# Patient Record
Sex: FEMALE | Race: WHITE | NOT HISPANIC OR LATINO | Employment: FULL TIME | URBAN - NONMETROPOLITAN AREA
[De-identification: names, ages, dates, MRNs, and addresses within clinical notes are randomized per-mention and may not be internally consistent; named-entity substitution may affect disease eponyms.]

---

## 2017-12-13 ENCOUNTER — INITIAL PRENATAL (OUTPATIENT)
Dept: OBSTETRICS AND GYNECOLOGY | Facility: CLINIC | Age: 32
End: 2017-12-13

## 2017-12-13 VITALS
SYSTOLIC BLOOD PRESSURE: 128 MMHG | WEIGHT: 148 LBS | HEIGHT: 62 IN | BODY MASS INDEX: 27.23 KG/M2 | DIASTOLIC BLOOD PRESSURE: 66 MMHG

## 2017-12-13 DIAGNOSIS — Z34.92 NORMAL PREGNANCY, SECOND TRIMESTER: ICD-10-CM

## 2017-12-13 DIAGNOSIS — Z34.92 PREGNANT AND NOT YET DELIVERED IN SECOND TRIMESTER: Primary | ICD-10-CM

## 2017-12-13 PROCEDURE — 99204 OFFICE O/P NEW MOD 45 MIN: CPT | Performed by: NURSE PRACTITIONER

## 2017-12-13 RX ORDER — PRENATAL VIT NO.126/IRON/FOLIC 28MG-0.8MG
TABLET ORAL DAILY
COMMUNITY
End: 2018-07-31

## 2017-12-13 RX ORDER — AMOXICILLIN 875 MG/1
875 TABLET, COATED ORAL 2 TIMES DAILY
COMMUNITY
End: 2018-01-03

## 2017-12-13 NOTE — PROGRESS NOTES
Chief Complaint   Patient presents with   • Initial Prenatal Visit     LMP 17, last pap with [facility], c/o abnormal discharge and has had one episode of bloody discharge, seen at hospital in [place]         HPI  , 12w0d presents to our office today for confirmation of pregnancy.  She reports she is doing well with x mild 1st trimester discomforts of pregnancy, n/v, fatigue, vicente/irritability.  Also c/o abnormal discharge - no itching or burning   States not certain of LMP   Working at Nursing Home   Taking PNV and tolerating   Smoking 5 cigs / day   Prev Preg's: 2   - last pregnancy C/S for breech       Past Medical History:   Diagnosis Date   • Asthma    • History of blood transfusion         Current Outpatient Prescriptions:   •  amoxicillin (AMOXIL) 875 MG tablet, Take 875 mg by mouth 2 (Two) Times a Day., Disp: , Rfl:   •  Prenatal Vit-Fe Fumarate-FA (PRENATAL, CLASSIC, VITAMIN) 28-0.8 MG tablet tablet, Take  by mouth Daily., Disp: , Rfl:    Allergies   Allergen Reactions   • Ceclor [Cefaclor]    • Sulfa Antibiotics       Past Surgical History:   Procedure Laterality Date   •  SECTION         Social History     Social History   • Marital status: Single     Spouse name: N/A   • Number of children: N/A   • Years of education: N/A     Occupational History   • Not on file.     Social History Main Topics   • Smoking status: Current Every Day Smoker     Packs/day: 0.25   • Smokeless tobacco: Never Used   • Alcohol use No   • Drug use: No   • Sexual activity: Yes     Partners: Male     Birth control/ protection: None     Other Topics Concern   • Not on file     Social History Narrative   • No narrative on file      History reviewed. No pertinent family history.    The following portions of the patient's history were reviewed and updated as appropriate:problem list, current medications, allergies, past family history, past medical history, past social history and past surgical  "history.    ROS    Pertinent items are noted in HPI, all other systems reviewed and negative    Physical Exam  /66  Ht 157.5 cm (62\")  Wt 67.1 kg (148 lb)  LMP 09/20/2017 (Approximate)  BMI 27.07 kg/m2     Psych: Altert and oriented to time, place and person  Mood and affect appropriate   General: well developed; well nourished  no acute distress  Neck: The neck is supple and the trachea is midline  Musculoskeletal: Normal gait  Full range of motion  Lungs:  breathing is unlabored  Back: Negative CVAT  Abdomen: Soft, non-tender, no organomegaly + FHT's  Lower Extremities: LE: Neg edema  Genitourinary: External Genitalia without erythema, lesions, or masses, Perineum is without inflammation or lesions      MDM  Impression:  Problems/Risks: Pregnancy with Active Problems(s) &/or Complication(s)  Previous C/S   Discomforts of pregnancy  Tobacco use in pregnancy   Tests done today: NuSwab culture  Rout NOB labs with HIV, CC urine culture & option CF screening   Topics discussed: Rout NOB education including nutrition, exercise, OTCmeds   screening options - CF today    adeq rest and fluids   flu vac  Tobacco use in pregnancy  discussed timing of u/s's  encouraged questions - call prn    Tests next visit: none     "

## 2017-12-13 NOTE — PATIENT INSTRUCTIONS
Second Trimester of Pregnancy  The second trimester is from week 13 through week 28, months 4 through 6. The second trimester is often a time when you feel your best. Your body has also adjusted to being pregnant, and you begin to feel better physically. Usually, morning sickness has lessened or quit completely, you may have more energy, and you may have an increase in appetite. The second trimester is also a time when the fetus is growing rapidly. At the end of the sixth month, the fetus is about 9 inches long and weighs about 1½ pounds. You will likely begin to feel the baby move (quickening) between 18 and 20 weeks of the pregnancy.  BODY CHANGES  Your body goes through many changes during pregnancy. The changes vary from woman to woman.   · Your weight will continue to increase. You will notice your lower abdomen bulging out.  · You may begin to get stretch marks on your hips, abdomen, and breasts.  · You may develop headaches that can be relieved by medicines approved by your health care provider.  · You may urinate more often because the fetus is pressing on your bladder.  · You may develop or continue to have heartburn as a result of your pregnancy.  · You may develop constipation because certain hormones are causing the muscles that push waste through your intestines to slow down.  · You may develop hemorrhoids or swollen, bulging veins (varicose veins).  · You may have back pain because of the weight gain and pregnancy hormones relaxing your joints between the bones in your pelvis and as a result of a shift in weight and the muscles that support your balance.  · Your breasts will continue to grow and be tender.  · Your gums may bleed and may be sensitive to brushing and flossing.  · Dark spots or blotches (chloasma, mask of pregnancy) may develop on your face. This will likely fade after the baby is born.  · A dark line from your belly button to the pubic area (linea nigra) may appear. This will likely fade  after the baby is born.  · You may have changes in your hair. These can include thickening of your hair, rapid growth, and changes in texture. Some women also have hair loss during or after pregnancy, or hair that feels dry or thin. Your hair will most likely return to normal after your baby is born.  WHAT TO EXPECT AT YOUR PRENATAL VISITS  During a routine prenatal visit:  · You will be weighed to make sure you and the fetus are growing normally.  · Your blood pressure will be taken.  · Your abdomen will be measured to track your baby's growth.  · The fetal heartbeat will be listened to.  · Any test results from the previous visit will be discussed.  Your health care provider may ask you:  · How you are feeling.  · If you are feeling the baby move.  · If you have had any abnormal symptoms, such as leaking fluid, bleeding, severe headaches, or abdominal cramping.  · If you are using any tobacco products, including cigarettes, chewing tobacco, and electronic cigarettes.  · If you have any questions.  Other tests that may be performed during your second trimester include:  · Blood tests that check for:  ¨ Low iron levels (anemia).  ¨ Gestational diabetes (between 24 and 28 weeks).  ¨ Rh antibodies.  · Urine tests to check for infections, diabetes, or protein in the urine.  · An ultrasound to confirm the proper growth and development of the baby.  · An amniocentesis to check for possible genetic problems.  · Fetal screens for spina bifida and Down syndrome.  · HIV (human immunodeficiency virus) testing. Routine prenatal testing includes screening for HIV, unless you choose not to have this test.  HOME CARE INSTRUCTIONS   · Avoid all smoking, herbs, alcohol, and unprescribed drugs. These chemicals affect the formation and growth of the baby.  · Do not use any tobacco products, including cigarettes, chewing tobacco, and electronic cigarettes. If you need help quitting, ask your health care provider. You may receive  counseling support and other resources to help you quit.  · Follow your health care provider's instructions regarding medicine use. There are medicines that are either safe or unsafe to take during pregnancy.  · Exercise only as directed by your health care provider. Experiencing uterine cramps is a good sign to stop exercising.  · Continue to eat regular, healthy meals.  · Wear a good support bra for breast tenderness.  · Do not use hot tubs, steam rooms, or saunas.  · Wear your seat belt at all times when driving.  · Avoid raw meat, uncooked cheese, cat litter boxes, and soil used by cats. These carry germs that can cause birth defects in the baby.  · Take your prenatal vitamins.  · Take 1690-4312 mg of calcium daily starting at the 20th week of pregnancy until you deliver your baby.  · Try taking a stool softener (if your health care provider approves) if you develop constipation. Eat more high-fiber foods, such as fresh vegetables or fruit and whole grains. Drink plenty of fluids to keep your urine clear or pale yellow.  · Take warm sitz baths to soothe any pain or discomfort caused by hemorrhoids. Use hemorrhoid cream if your health care provider approves.  · If you develop varicose veins, wear support hose. Elevate your feet for 15 minutes, 3-4 times a day. Limit salt in your diet.  · Avoid heavy lifting, wear low heel shoes, and practice good posture.  · Rest with your legs elevated if you have leg cramps or low back pain.  · Visit your dentist if you have not gone yet during your pregnancy. Use a soft toothbrush to brush your teeth and be gentle when you floss.  · A sexual relationship may be continued unless your health care provider directs you otherwise.  · Continue to go to all your prenatal visits as directed by your health care provider.  SEEK MEDICAL CARE IF:   · You have dizziness.  · You have mild pelvic cramps, pelvic pressure, or nagging pain in the abdominal area.  · You have persistent nausea,  vomiting, or diarrhea.  · You have a bad smelling vaginal discharge.  · You have pain with urination.  SEEK IMMEDIATE MEDICAL CARE IF:   · You have a fever.  · You are leaking fluid from your vagina.  · You have spotting or bleeding from your vagina.  · You have severe abdominal cramping or pain.  · You have rapid weight gain or loss.  · You have shortness of breath with chest pain.  · You notice sudden or extreme swelling of your face, hands, ankles, feet, or legs.  · You have not felt your baby move in over an hour.  · You have severe headaches that do not go away with medicine.  · You have vision changes.     This information is not intended to replace advice given to you by your health care provider. Make sure you discuss any questions you have with your health care provider.

## 2017-12-14 LAB
ABO GROUP BLD: (no result)
BASOPHILS # BLD AUTO: 0 X10E3/UL (ref 0–0.2)
BASOPHILS NFR BLD AUTO: 0 %
BLD GP AB SCN SERPL QL: (no result)
BLD GP AB SCN SERPL QL: POSITIVE
BLOOD GROUP ANTIBODIES SERPL: ABNORMAL
EOSINOPHIL # BLD AUTO: 0.1 X10E3/UL (ref 0–0.4)
EOSINOPHIL NFR BLD AUTO: 1 %
ERYTHROCYTE [DISTWIDTH] IN BLOOD BY AUTOMATED COUNT: 13.3 % (ref 12.3–15.4)
HBV SURFACE AG SERPL QL IA: NEGATIVE
HCT VFR BLD AUTO: 37.4 % (ref 34–46.6)
HGB BLD-MCNC: 12.7 G/DL (ref 11.1–15.9)
HIV 1+2 AB+HIV1 P24 AG SERPL QL IA: NON REACTIVE
IMM GRANULOCYTES # BLD: 0 X10E3/UL (ref 0–0.1)
IMM GRANULOCYTES NFR BLD: 0 %
LYMPHOCYTES # BLD AUTO: 1.9 X10E3/UL (ref 0.7–3.1)
LYMPHOCYTES NFR BLD AUTO: 20 %
MCH RBC QN AUTO: 32.4 PG (ref 26.6–33)
MCHC RBC AUTO-ENTMCNC: 34 G/DL (ref 31.5–35.7)
MCV RBC AUTO: 95 FL (ref 79–97)
MONOCYTES # BLD AUTO: 0.5 X10E3/UL (ref 0.1–0.9)
MONOCYTES NFR BLD AUTO: 6 %
NEUTROPHILS # BLD AUTO: 6.7 X10E3/UL (ref 1.4–7)
NEUTROPHILS NFR BLD AUTO: 73 %
PLATELET # BLD AUTO: 267 X10E3/UL (ref 150–379)
RBC # BLD AUTO: 3.92 X10E6/UL (ref 3.77–5.28)
RH BLD: NEGATIVE
RPR SER QL: NON REACTIVE
RUBV IGG SERPL IA-ACNC: 2.83 INDEX
WBC # BLD AUTO: 9.2 X10E3/UL (ref 3.4–10.8)
XXX BLOOD GROUP AB TITR SERPL AHG: 512 {TITER}

## 2017-12-19 DIAGNOSIS — R71.8 LOW ERYTHROCYTE KELL ANTIGEN: Primary | ICD-10-CM

## 2017-12-19 PROBLEM — Z67.91 RH NEGATIVE STATUS DURING PREGNANCY: Status: ACTIVE | Noted: 2017-12-13

## 2017-12-19 PROBLEM — O26.899 RH NEGATIVE STATUS DURING PREGNANCY: Status: ACTIVE | Noted: 2017-12-13

## 2017-12-19 PROBLEM — O36.0191: Status: ACTIVE | Noted: 2017-12-13

## 2017-12-20 LAB
A VAGINAE DNA VAG QL NAA+PROBE: ABNORMAL SCORE
BVAB2 DNA VAG QL NAA+PROBE: ABNORMAL SCORE
C ALBICANS DNA VAG QL NAA+PROBE: NEGATIVE
C GLABRATA DNA VAG QL NAA+PROBE: NEGATIVE
C TRACH RRNA SPEC QL NAA+PROBE: NEGATIVE
MEGA1 DNA VAG QL NAA+PROBE: ABNORMAL SCORE
N GONORRHOEA RRNA SPEC QL NAA+PROBE: NEGATIVE
T VAGINALIS RRNA SPEC QL NAA+PROBE: NEGATIVE

## 2018-01-03 ENCOUNTER — APPOINTMENT (OUTPATIENT)
Dept: WOMENS IMAGING | Facility: HOSPITAL | Age: 33
End: 2018-01-03

## 2018-01-03 ENCOUNTER — ROUTINE PRENATAL (OUTPATIENT)
Dept: OBSTETRICS AND GYNECOLOGY | Facility: CLINIC | Age: 33
End: 2018-01-03

## 2018-01-03 VITALS — SYSTOLIC BLOOD PRESSURE: 126 MMHG | BODY MASS INDEX: 28.72 KG/M2 | WEIGHT: 157 LBS | DIASTOLIC BLOOD PRESSURE: 66 MMHG

## 2018-01-03 DIAGNOSIS — B96.89 BACTERIAL VAGINOSIS: ICD-10-CM

## 2018-01-03 DIAGNOSIS — O26.892 RH NEGATIVE STATUS DURING PREGNANCY IN SECOND TRIMESTER: ICD-10-CM

## 2018-01-03 DIAGNOSIS — Z34.82 ENCOUNTER FOR SUPERVISION OF OTHER NORMAL PREGNANCY IN SECOND TRIMESTER: Primary | ICD-10-CM

## 2018-01-03 DIAGNOSIS — Z67.91 RH NEGATIVE STATUS DURING PREGNANCY IN SECOND TRIMESTER: ICD-10-CM

## 2018-01-03 DIAGNOSIS — N76.0 BACTERIAL VAGINOSIS: ICD-10-CM

## 2018-01-03 PROBLEM — Z34.80 ENCOUNTER FOR SUPERVISION OF OTHER NORMAL PREGNANCY: Status: ACTIVE | Noted: 2018-01-03

## 2018-01-03 PROCEDURE — 99213 OFFICE O/P EST LOW 20 MIN: CPT | Performed by: MIDWIFE

## 2018-01-03 RX ORDER — METRONIDAZOLE 500 MG/1
500 TABLET ORAL 2 TIMES DAILY
Qty: 14 TABLET | Refills: 0 | Status: SHIPPED | OUTPATIENT
Start: 2018-01-03 | End: 2018-01-10

## 2018-01-03 NOTE — PROGRESS NOTES
Chief Complaint   Patient presents with   • Routine Prenatal Visit     HAS APPT WITH [facility] TUESDAY, DIDNT GET TO SEE THEM TODAY DUE TO INSURANCE.        HPI: Elizabeth is a  currently at 15w0d who today reports the following:  Nausea - improved as compared to the prior visit; Vaginal bleeding -  No; Heartburn - No.  Went to [facility] today but her [employer] wasn't active so she had to reschedule.  Has noticed a vaginal discharge since first visit but denies itching, burning, or odor.  States her phone has been out of service.    ROS:   GI:   Nausea - improved as compared to the prior visit; Constipation - No   Neuro:  Headache - No; Visual disturbances - No.    EXAM:   Vitals:  See prenatal flowsheet, /66, Wt +9#   Abdomen:   See prenatal flowsheet, soft nontender   Pelvic:  See prenatal flowsheet   Urine:  See prenatal flowsheet    Prenatal Labs  Lab Results   Component Value Date    HGB 12.7 2017    HEPBSAG Negative 2017    ABO O 2017    RH Negative 2017    ABSCRN See Final Results 2017    ABSCRN Positive (A) 2017    NCX1MEH7 Non Reactive 2017       MDM:  Impression: Supervision of high risk pregnancy  Previous C/S with route of delivery to be determined  Rh negative  Tobacco use in pregnancy  + ABS   + BV   Tests done today: none   Topics discussed: Flagyl 500 mg BID   + ABS and risk to fetus   Tests next visit: none   Next visit: 4 weeks     This note was electronically signed.  ALFONSO Perera  1/3/2018

## 2018-01-09 ENCOUNTER — APPOINTMENT (OUTPATIENT)
Dept: WOMENS IMAGING | Facility: HOSPITAL | Age: 33
End: 2018-01-09

## 2018-01-12 ENCOUNTER — LAB REQUISITION (OUTPATIENT)
Dept: LAB | Facility: HOSPITAL | Age: 33
End: 2018-01-12

## 2018-01-12 ENCOUNTER — HOSPITAL ENCOUNTER (OUTPATIENT)
Dept: WOMENS IMAGING | Facility: HOSPITAL | Age: 33
Discharge: HOME | End: 2018-01-12
Admitting: NURSE PRACTITIONER

## 2018-01-12 ENCOUNTER — OFFICE VISIT (OUTPATIENT)
Dept: OBSTETRICS AND GYNECOLOGY | Facility: HOSPITAL | Age: 33
End: 2018-01-12

## 2018-01-12 VITALS
WEIGHT: 153 LBS | BODY MASS INDEX: 28.16 KG/M2 | SYSTOLIC BLOOD PRESSURE: 132 MMHG | DIASTOLIC BLOOD PRESSURE: 81 MMHG | HEIGHT: 62 IN

## 2018-01-12 DIAGNOSIS — O26.892 RH NEGATIVE STATUS DURING PREGNANCY IN SECOND TRIMESTER: ICD-10-CM

## 2018-01-12 DIAGNOSIS — O35.00X0 CENTRAL NERVOUS SYSTEM MALFORMATION IN FETUS AFFECTING OBSTETRICAL CARE, SINGLE OR UNSPECIFIED FETUS: ICD-10-CM

## 2018-01-12 DIAGNOSIS — Z34.82 ENCOUNTER FOR SUPERVISION OF OTHER NORMAL PREGNANCY IN SECOND TRIMESTER: Primary | ICD-10-CM

## 2018-01-12 DIAGNOSIS — Z67.91 RH NEGATIVE STATUS DURING PREGNANCY IN SECOND TRIMESTER: ICD-10-CM

## 2018-01-12 DIAGNOSIS — Z00.00 ENCOUNTER FOR GENERAL ADULT MEDICAL EXAMINATION WITHOUT ABNORMAL FINDINGS: ICD-10-CM

## 2018-01-12 PROBLEM — O26.899 RH NEGATIVE STATUS DURING PREGNANCY: Status: RESOLVED | Noted: 2017-12-13 | Resolved: 2018-01-12

## 2018-01-12 PROBLEM — O36.1120 ISOIMMUNIZATION FROM BLOOD GROUP INCOMPATIBILITY DURING PREGNANCY IN SECOND TRIMESTER: Status: ACTIVE | Noted: 2018-01-12

## 2018-01-12 PROCEDURE — 76811 OB US DETAILED SNGL FETUS: CPT

## 2018-01-12 PROCEDURE — 76811 OB US DETAILED SNGL FETUS: CPT | Performed by: OBSTETRICS & GYNECOLOGY

## 2018-01-12 PROCEDURE — 36415 COLL VENOUS BLD VENIPUNCTURE: CPT

## 2018-01-12 PROCEDURE — 99241 PR OFFICE CONSULTATION NEW/ESTAB PATIENT 15 MIN: CPT | Performed by: OBSTETRICS & GYNECOLOGY

## 2018-01-12 NOTE — PROGRESS NOTES
Documentation of the ultasound findings, images, and interpretations as well as consultation note will be available in the patient's Viewpoint report located in the Chart Review Imaging tab in [facility].

## 2018-01-22 ENCOUNTER — TELEPHONE (OUTPATIENT)
Dept: WOMENS IMAGING | Facility: HOSPITAL | Age: 33
End: 2018-01-22

## 2018-01-23 ENCOUNTER — TELEPHONE (OUTPATIENT)
Dept: WOMENS IMAGING | Facility: HOSPITAL | Age: 33
End: 2018-01-23

## 2018-01-24 ENCOUNTER — TELEPHONE (OUTPATIENT)
Dept: WOMENS IMAGING | Facility: HOSPITAL | Age: 33
End: 2018-01-24

## 2018-01-29 ENCOUNTER — ROUTINE PRENATAL (OUTPATIENT)
Dept: OBSTETRICS AND GYNECOLOGY | Facility: CLINIC | Age: 33
End: 2018-01-29

## 2018-01-29 VITALS — WEIGHT: 156 LBS | SYSTOLIC BLOOD PRESSURE: 126 MMHG | BODY MASS INDEX: 29 KG/M2 | DIASTOLIC BLOOD PRESSURE: 64 MMHG

## 2018-01-29 DIAGNOSIS — Z34.82 ENCOUNTER FOR SUPERVISION OF OTHER NORMAL PREGNANCY IN SECOND TRIMESTER: ICD-10-CM

## 2018-01-29 DIAGNOSIS — Z34.92 NORMAL PREGNANCY, SECOND TRIMESTER: Primary | ICD-10-CM

## 2018-01-29 PROCEDURE — 99213 OFFICE O/P EST LOW 20 MIN: CPT | Performed by: NURSE PRACTITIONER

## 2018-02-11 ENCOUNTER — TELEPHONE (OUTPATIENT)
Dept: OBSTETRICS AND GYNECOLOGY | Facility: CLINIC | Age: 33
End: 2018-02-11

## 2018-02-11 NOTE — TELEPHONE ENCOUNTER
Aundrea  Please inform Elizabeth:  1.  As written per Benita our  re: pts request for another U/S.  (The note sent to you last week).  Inform her I am sorry but insurance will not pay and the  checked into it as well.inform if there is any indication for repeat sooner than 30 weeks - it would be done without question.  However, at this time we cannot repeat u/s until 30 wks.    Also     2.  This is very important.  I would like to speak with her confidentially re: whether she would need further f/u's with [facility] in relation to the Margy antibody. This can be done at her next appointment or I can call her back at a certain time that we can speak confidentially.    Thanks    (I will need to confirm paternity & that the FOB is the one we tested for [place] antibodies - if absolutely sure - then she will not need any further f/u's with [facility])

## 2018-02-26 ENCOUNTER — ROUTINE PRENATAL (OUTPATIENT)
Dept: OBSTETRICS AND GYNECOLOGY | Facility: CLINIC | Age: 33
End: 2018-02-26

## 2018-02-26 VITALS — WEIGHT: 161 LBS | BODY MASS INDEX: 29.93 KG/M2 | DIASTOLIC BLOOD PRESSURE: 70 MMHG | SYSTOLIC BLOOD PRESSURE: 134 MMHG

## 2018-02-26 DIAGNOSIS — Z34.82 ENCOUNTER FOR SUPERVISION OF OTHER NORMAL PREGNANCY IN SECOND TRIMESTER: ICD-10-CM

## 2018-02-26 DIAGNOSIS — Z34.92 NORMAL PREGNANCY, SECOND TRIMESTER: Primary | ICD-10-CM

## 2018-02-26 PROCEDURE — 99213 OFFICE O/P EST LOW 20 MIN: CPT | Performed by: NURSE PRACTITIONER

## 2018-02-26 NOTE — PROGRESS NOTES
[id number]  Chief Complaint   Patient presents with   • Routine Prenatal Visit     no complaints         HPI  , 22w5d reports doing well - good FM - would like TOLAC    ROS  /70  Wt 73 kg (161 lb)  LMP 2017 (Approximate)  BMI 29.93 kg/m2 -See Prenatal Assessment    ROS:  GI: Nausea - No; Constipation - No; Diarrhea - No    Neuro: Headache - No; Visual change - No      EXAM  General Appearance:  Lungs: Breathing unlabored  Abdomen:  See flow sheet for Fundal ht, FM, FHT's  LE: Neg edema    MDM  Impression:  Problems/Risk Pregnancy with Active Problems(s) &/or Complication(s)  Previous C/S   desires TOLAC  RH Negative   Tests done today: none   Topics discussed: continue to note good FM  Nutririon rev'd & exercise  info on TOLAC written info - will review previous C/S report  Discussed LITA - pt confirmed paternity   encouraged questions - call prn    Tests next visit: Glucola and CBC  antibody screen     OB History      Para Term  AB Living    4 3 3 0 0 3    SAB TAB Ectopic Multiple Live Births    0 0 0 0 3        Obstetric Comments    New paternity for pregnancy #4          Past Medical History:   Diagnosis Date   • 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, unspecified trimester, fetus 1 2017    POSITIVE ANTIBODY SCREEN   • Asthma     as a child   • History of blood transfusion 2009    5 weeks postpartum   • Rh negative status during pregnancy 2017       Past Surgical History:   Procedure Laterality Date   •  SECTION  03/15/2013       Family History   Problem Relation Age of Onset   • No Known Problems Father    • No Known Problems Mother    • No Known Problems Brother    • No Known Problems Sister    • No Known Problems Son    • No Known Problems Daughter    • No Known Problems Paternal Grandfather    • No Known Problems Paternal Grandmother    • No Known Problems Maternal Grandmother    • No Known Problems Maternal Grandfather        Social History     Social  History   • Marital status: Single     Spouse name: N/A   • Number of children: N/A   • Years of education: N/A     Occupational History   • Not on file.     Social History Main Topics   • Smoking status: Current Every Day Smoker     Packs/day: 0.25     Types: Cigarettes   • Smokeless tobacco: Never Used   • Alcohol use No   • Drug use: No   • Sexual activity: Yes     Partners: Male     Birth control/ protection: None     Other Topics Concern   • Not on file     Social History Narrative

## 2018-02-26 NOTE — PATIENT INSTRUCTIONS
Vaginal Birth After  Delivery  Vaginal birth after  delivery () is giving birth vaginally after previously delivering a baby by a . In the past, if a woman had a  delivery, all births afterward would be done by  delivery. This is no longer true. It can be safe for the mother to try a vaginal delivery after having a  delivery.  It is important to discuss  with your health care provider early in the pregnancy so you can understand the risks, benefits, and options. It will give you time to decide what is best in your particular case. The final decision about whether to have a  or repeat  delivery should be between you and your health care provider. Any changes in your health or your baby’s health during your pregnancy may make it necessary to change your initial decision about .  Women who plan to have a  should check with their health care provider to be sure that:  · The previous  delivery was done with a low transverse uterine cut (incision) (not a vertical classical incision).  · The birth canal is big enough for the baby.  · There were no other operations on the uterus.  · An electronic fetal monitor (EFM) will be on at all times during labor.  · An operating room will be available and ready in case an emergency  delivery is needed.  · A health care provider and surgical nursing staff will be available at all times during labor to be ready to do an emergency delivery  if necessary.  · An anesthesiologist will be present in case an emergency  delivery is needed.  · The nursery is prepared and has adequate personnel and necessary equipment available to care for the baby in case of an emergency  delivery.  Benefits of   · Boon stay in the hospital.  · Avoidance of risks associated with  delivery, such as:  ¨ Surgical complications, such as opening of the incision or hernia in the  incision.  ¨ Injury to other organs.  ¨ Fever. This can occur if an infection develops after surgery. It can also occur as a reaction to the medicine given to make you numb during the surgery.  · Less blood loss and need for blood transfusions.  · Lower risk of blood clots and infection.  · [place] recovery.  · Decreased risk for having to remove the uterus (hysterectomy).  · Decreased risk for the placenta to completely or partially cover the opening of the uterus (placenta previa) with a future pregnancy.  · Decrease risk in future labor and delivery.  Risks of a   · Tearing (rupture) of the uterus. This is occurs in less than 1% of VBACs. The risk of this happening is higher if:  ¨ Steps are taken to begin the labor process (induce labor) or stimulate or strengthen contractions (augment labor).  ¨ Medicine is used to soften (ripen) the cervix.  · Having to remove the uterus (hysterectomy) if it ruptures.   should not be done if:  · The previous  delivery was done with a vertical (classical) or T-shaped incision or you do not know what kind of incision was made.  · You had a ruptured uterus.  · You have had certain types of surgery on your uterus, such as removal of uterine fibroids. Ask your health care provider about other types of surgeries that prevent you from having a .  · You have certain medical or childbirth (obstetrical) problems.  · There are problems with the baby.  · You have had two previous  deliveries and no vaginal deliveries.  Other facts to know about :  · It is safe to have an epidural anesthetic with .  · It is safe to turn the baby from a breech position (attempt an external cephalic version).  · It is safe to try a  with twins.  ·  may not be successful if your baby weights 8.8 lb (4 kg) or more. However, weight predictions are not always accurate and should not be used alone to decide if  is right for you.  · There is an increased failure rate  if the time between the  delivery and  is less than 19 months.  · Your health care provider may advise against a  if you have preeclampsia (high blood pressure, protein in the urine, and swelling of face and extremities).  ·  is often successful if you previously gave birth vaginally.  ·  is often successful when the labor starts spontaneously before the due date.  · Delivering a baby through a  is similar to having a normal spontaneous vaginal delivery.  This information is not intended to replace advice given to you by your health care provider. Make sure you discuss any questions you have with your health care provider.  Document Released: 2008 Document Revised: 2017 Document Reviewed: 2014  [employer] Interactive Patient Education © 2017 [employer]

## 2018-03-04 ENCOUNTER — TELEPHONE (OUTPATIENT)
Dept: OBSTETRICS AND GYNECOLOGY | Facility: CLINIC | Age: 33
End: 2018-03-04

## 2018-03-26 ENCOUNTER — RESULTS ENCOUNTER (OUTPATIENT)
Dept: OBSTETRICS AND GYNECOLOGY | Facility: CLINIC | Age: 33
End: 2018-03-26

## 2018-03-26 ENCOUNTER — ROUTINE PRENATAL (OUTPATIENT)
Dept: OBSTETRICS AND GYNECOLOGY | Facility: CLINIC | Age: 33
End: 2018-03-26

## 2018-03-26 VITALS — BODY MASS INDEX: 29.93 KG/M2 | SYSTOLIC BLOOD PRESSURE: 128 MMHG | DIASTOLIC BLOOD PRESSURE: 74 MMHG | WEIGHT: 161 LBS

## 2018-03-26 DIAGNOSIS — Z34.82 ENCOUNTER FOR SUPERVISION OF OTHER NORMAL PREGNANCY IN SECOND TRIMESTER: ICD-10-CM

## 2018-03-26 DIAGNOSIS — Z34.92 NORMAL PREGNANCY, SECOND TRIMESTER: Primary | ICD-10-CM

## 2018-03-26 LAB
BASOPHILS # BLD AUTO: 0.03 10*3/MM3 (ref 0–0.2)
BASOPHILS NFR BLD AUTO: 0.3 % (ref 0–2.5)
EOSINOPHIL # BLD AUTO: 0.07 10*3/MM3 (ref 0–0.7)
EOSINOPHIL NFR BLD AUTO: 0.8 % (ref 0–7)
ERYTHROCYTE [DISTWIDTH] IN BLOOD BY AUTOMATED COUNT: 13.5 % (ref 11.5–14.5)
GLUCOSE 1H P 50 G GLC PO SERPL-MCNC: 90 MG/DL
HCT VFR BLD AUTO: 32.8 % (ref 37–47)
HGB BLD-MCNC: 11.3 G/DL (ref 12–16)
IMM GRANULOCYTES # BLD: 0.03 10*3/MM3 (ref 0–0.06)
IMM GRANULOCYTES NFR BLD: 0.3 % (ref 0–0.6)
LYMPHOCYTES # BLD AUTO: 1.71 10*3/MM3 (ref 0.6–3.4)
LYMPHOCYTES NFR BLD AUTO: 19.6 % (ref 10–50)
MCH RBC QN AUTO: 33.4 PG (ref 27–31)
MCHC RBC AUTO-ENTMCNC: 34.5 G/DL (ref 30–37)
MCV RBC AUTO: 97 FL (ref 81–99)
MONOCYTES # BLD AUTO: 0.52 10*3/MM3 (ref 0–0.9)
MONOCYTES NFR BLD AUTO: 6 % (ref 0–12)
NEUTROPHILS # BLD AUTO: 6.37 10*3/MM3 (ref 2–6.9)
NEUTROPHILS NFR BLD AUTO: 73 % (ref 37–80)
NRBC BLD AUTO-RTO: 0 /100 WBC (ref 0–0)
PLATELET # BLD AUTO: 197 10*3/MM3 (ref 130–400)
RBC # BLD AUTO: 3.38 10*6/MM3 (ref 4.2–5.4)
WBC # BLD AUTO: 8.73 10*3/MM3 (ref 4.8–10.8)

## 2018-03-26 PROCEDURE — 99213 OFFICE O/P EST LOW 20 MIN: CPT | Performed by: NURSE PRACTITIONER

## 2018-03-26 NOTE — PROGRESS NOTES
[id number]  Chief Complaint   Patient presents with   • Routine Prenatal Visit     glucola today, no complaints         HPI  , 26w5d reports doing well - no c/o - good FM     ROS  /74   Wt 73 kg (161 lb)   LMP 2017 (Approximate)   BMI 29.93 kg/m²  -See Prenatal Assessment    ROS:  GI: Nausea - No; Constipation - No; Diarrhea - No    Neuro: Headache - No; Visual change - No      EXAM  General Appearance:  Lungs: Breathing unlabored  Abdomen:  See flow sheet for Fundal ht, FM, FHT's  LE: Neg edema    MDM  Impression:  Problems/Risk Pregnancy with Active Problems(s) &/or Complication(s)  Previous C/S    RH negative   Tests done today: 1 hr. 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, unspecified trimester, fetus 1 2017    POSITIVE ANTIBODY SCREEN   • Asthma     as a child   • History of blood transfusion 2009    5 weeks postpartum   • Rh negative status during pregnancy 2017       Past Surgical History:   Procedure Laterality Date   •  SECTION  03/15/2013       Family History   Problem Relation Age of Onset   • No Known Problems Father    • No Known Problems Mother    • No Known Problems Brother    • No Known Problems Sister    • No Known Problems Son    • No Known Problems Daughter    • No Known Problems Paternal Grandfather    • No Known Problems Paternal Grandmother    • No Known Problems Maternal Grandmother    • No Known Problems Maternal Grandfather        Social History     Social History   • Marital status: Single     Spouse name: N/A   • Number of children: N/A   • Years of education: N/A      Occupational History   • Not on file.     Social History Main Topics   • Smoking status: Current Every Day Smoker     Packs/day: 0.25     Types: Cigarettes   • Smokeless tobacco: Never Used   • Alcohol use No   • Drug use: No   • Sexual activity: Yes     Partners: Male     Birth control/ protection: None     Other Topics Concern   • Not on file     Social History Narrative   • No narrative on file

## 2018-04-12 ENCOUNTER — TELEPHONE (OUTPATIENT)
Dept: OBSTETRICS AND GYNECOLOGY | Facility: CLINIC | Age: 33
End: 2018-04-12

## 2018-04-12 ENCOUNTER — CLINICAL SUPPORT (OUTPATIENT)
Dept: OBSTETRICS AND GYNECOLOGY | Facility: CLINIC | Age: 33
End: 2018-04-12

## 2018-04-12 VITALS — BODY MASS INDEX: 29.93 KG/M2 | WEIGHT: 161 LBS

## 2018-04-12 DIAGNOSIS — Z67.91 RH NEGATIVE STATUS DURING PREGNANCY IN SECOND TRIMESTER: ICD-10-CM

## 2018-04-12 DIAGNOSIS — O26.892 RH NEGATIVE STATUS DURING PREGNANCY IN SECOND TRIMESTER: ICD-10-CM

## 2018-04-12 PROCEDURE — 96372 THER/PROPH/DIAG INJ SC/IM: CPT | Performed by: OBSTETRICS & GYNECOLOGY

## 2018-04-12 NOTE — TELEPHONE ENCOUNTER
----- Message from Cassidy Chambers sent at 4/12/2018  2:07 PM EDT -----  Contact: PT  PT IS PREGNANT AND CALLED REQUESTING AMOXICILLIN FOR A SINUS INFECTION.  SHE USES [facility] IN [facility].  THANKS

## 2018-04-12 NOTE — TELEPHONE ENCOUNTER
Please inform I attempted to call her (no answer)   Please check if she feels certain + sinusitis vs common cold or allergy.  Could try OTC meds 1st for common cold or allergy.  Let me know - or may consider seeing her PCP for evaluation.  Thanks

## 2018-04-23 ENCOUNTER — ROUTINE PRENATAL (OUTPATIENT)
Dept: OBSTETRICS AND GYNECOLOGY | Facility: CLINIC | Age: 33
End: 2018-04-23

## 2018-04-23 VITALS — SYSTOLIC BLOOD PRESSURE: 144 MMHG | BODY MASS INDEX: 30.11 KG/M2 | DIASTOLIC BLOOD PRESSURE: 70 MMHG | WEIGHT: 162 LBS

## 2018-04-23 DIAGNOSIS — Z34.82 ENCOUNTER FOR SUPERVISION OF OTHER NORMAL PREGNANCY IN SECOND TRIMESTER: ICD-10-CM

## 2018-04-23 PROCEDURE — 99213 OFFICE O/P EST LOW 20 MIN: CPT | Performed by: OBSTETRICS & GYNECOLOGY

## 2018-04-23 RX ORDER — AMOXICILLIN 875 MG/1
TABLET, COATED ORAL
Refills: 0 | COMMUNITY
Start: 2018-04-16 | End: 2018-05-09

## 2018-04-23 NOTE — PROGRESS NOTES
Chief Complaint   Patient presents with   • Routine Prenatal Visit     Growth Scan, pt request note for work to reduce hours, Good fetal movement        HPI:   , 30w5d gestation reports doing well    ROS:  See Prenatal Episode/Flowsheet  /70   Wt 73.5 kg (162 lb)   LMP 2017 (Approximate)   BMI 30.11 kg/m²      EXAM:  EXTREMITIES:  No swelling-See Prenatal Episode/Flowsheet    ABDOMEN:  FHTs/Movement noted-See Prenatal Episode/Flowsheet    URINE GLUCOSE/PROTEIN:  See Prenatal Episode/Flowsheet    PELVIC EXAM:  See Prenatal Episode/Flowsheet  CV:  Lungs:    MDM:    Lab Results   Component Value Date    HGB 11.3 (L) 2018    RUBELLAABIGG 2.83 2017    HEPBSAG Negative 2017    ABO O 2017    RH Negative 2017    ABSCRN See Final Results 2017    ABSCRN Positive (A) 2017    [id number] Non Reactive 2017       U/S:The 67th percentile.  Symmetric growth.  Amniotic fluid 11.  Within normal limits.  Vertex.  Posterior placenta.  Female.    1. IUP 30w5d  2. Routine care   3. 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  Patient to sign tubal papers today please

## 2018-05-09 ENCOUNTER — ROUTINE PRENATAL (OUTPATIENT)
Dept: OBSTETRICS AND GYNECOLOGY | Facility: CLINIC | Age: 33
End: 2018-05-09

## 2018-05-09 VITALS — BODY MASS INDEX: 30.49 KG/M2 | WEIGHT: 164 LBS | SYSTOLIC BLOOD PRESSURE: 136 MMHG | DIASTOLIC BLOOD PRESSURE: 72 MMHG

## 2018-05-09 DIAGNOSIS — Z34.83 ENCOUNTER FOR SUPERVISION OF OTHER NORMAL PREGNANCY IN THIRD TRIMESTER: ICD-10-CM

## 2018-05-09 DIAGNOSIS — Z34.93 NORMAL PREGNANCY, THIRD TRIMESTER: Primary | ICD-10-CM

## 2018-05-09 PROCEDURE — 99213 OFFICE O/P EST LOW 20 MIN: CPT | Performed by: NURSE PRACTITIONER

## 2018-05-09 NOTE — PROGRESS NOTES
Chief Complaint   Patient presents with   • Routine Prenatal Visit     no complaints         HPI  , 33w0d reports she wants TOLAC.  She does plan for BTL.   She has no c/o - doing well.  Good FM    ROS  /72   Wt 74.4 kg (164 lb)   LMP 2017 (Approximate)   BMI 30.49 kg/m²  -See Prenatal Assessment    ROS:  GI: Nausea - No; Constipation - No; Diarrhea - No    Neuro: Headache - No; Visual change - No      EXAM  General Appearance:  Lungs: Breathing unlabored  Abdomen:  See flow sheet for Fundal ht, FM, FHT's  LE: Neg edema    MDM  Impression:  Problems/Risks: Pregnancy with Active Problems(s) &/or Complication(s)  Previous C/S   Desires TOLAC   Tests done today: none   Topics discussed: continue to note good FM  T-dap   Written info on  B&R given - to discuss next visit  encouraged questions - call prn    Tests next visit: GBS     OB History      Para Term  AB Living    4 3 3 0 0 3    SAB TAB Ectopic Molar Multiple Live Births    0 0 0 0 0 3        Obstetric Comments    New paternity for pregnancy #4          Past Medical History:   Diagnosis Date   • 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, unspecified trimester, fetus 1 2017    POSITIVE ANTIBODY SCREEN   • Asthma     as a child   • History of blood transfusion 2009    5 weeks postpartum   • Rh negative status during pregnancy 2017       Past Surgical History:   Procedure Laterality Date   •  SECTION  03/15/2013       Family History   Problem Relation Age of Onset   • No Known Problems Father    • No Known Problems Mother    • No Known Problems Brother    • No Known Problems Sister    • No Known Problems Son    • No Known Problems Daughter    • No Known Problems Paternal Grandfather    • No Known Problems Paternal Grandmother    • No Known Problems Maternal Grandmother    • No Known Problems Maternal Grandfather        Social History     Social History   • Marital status: Single     Spouse name: N/A   • Number  of children: N/A   • Years of education: N/A     Occupational History   • Not on file.     Social History Main Topics   • Smoking status: Current Every Day Smoker     Packs/day: 0.25     Types: Cigarettes   • Smokeless tobacco: Never Used   • Alcohol use No   • Drug use: No   • Sexual activity: Yes     Partners: Male     Birth control/ protection: None     Other Topics Concern   • Not on file     Social History Narrative   • No narrative on file

## 2018-05-09 NOTE — PATIENT INSTRUCTIONS
Vaginal Birth After  Delivery  Vaginal birth after  delivery () is giving birth vaginally after previously delivering a baby by a . In the past, if a woman had a  delivery, all births afterward would be done by  delivery. This is no longer true. It can be safe for the mother to try a vaginal delivery after having a  delivery.  It is important to discuss  with your health care provider early in the pregnancy so you can understand the risks, benefits, and options. It will give you time to decide what is best in your particular case. The final decision about whether to have a  or repeat  delivery should be between you and your health care provider. Any changes in your health or your baby’s health during your pregnancy may make it necessary to change your initial decision about .  Women who plan to have a  should check with their health care provider to be sure that:  · The previous  delivery was done with a low transverse uterine cut (incision) (not a vertical classical incision).  · The birth canal is big enough for the baby.  · There were no other operations on the uterus.  · An electronic fetal monitor (EFM) will be on at all times during labor.  · An operating room will be available and ready in case an emergency  delivery is needed.  · A health care provider and surgical nursing staff will be available at all times during labor to be ready to do an emergency delivery  if necessary.  · An anesthesiologist will be present in case an emergency  delivery is needed.  · The nursery is prepared and has adequate personnel and necessary equipment available to care for the baby in case of an emergency  delivery.  Benefits of   · Kelley stay in the hospital.  · Avoidance of risks associated with  delivery, such as:  ¨ Surgical complications, such as opening of the incision or hernia in the  incision.  ¨ Injury to other organs.  ¨ Fever. This can occur if an infection develops after surgery. It can also occur as a reaction to the medicine given to make you numb during the surgery.  · Less blood loss and need for blood transfusions.  · Lower risk of blood clots and infection.  · Wallback recovery.  · Decreased risk for having to remove the uterus (hysterectomy).  · Decreased risk for the placenta to completely or partially cover the opening of the uterus (placenta previa) with a future pregnancy.  · Decrease risk in future labor and delivery.  Risks of a   · Tearing (rupture) of the uterus. This is occurs in less than 1% of VBACs. The risk of this happening is higher if:  ¨ Steps are taken to begin the labor process (induce labor) or stimulate or strengthen contractions (augment labor).  ¨ Medicine is used to soften (ripen) the cervix.  · Having to remove the uterus (hysterectomy) if it ruptures.   should not be done if:  · The previous  delivery was done with a vertical (classical) or T-shaped incision or you do not know what kind of incision was made.  · You had a ruptured uterus.  · You have had certain types of surgery on your uterus, such as removal of uterine fibroids. Ask your health care provider about other types of surgeries that prevent you from having a .  · You have certain medical or childbirth (obstetrical) problems.  · There are problems with the baby.  · You have had two previous  deliveries and no vaginal deliveries.  Other facts to know about :  · It is safe to have an epidural anesthetic with .  · It is safe to turn the baby from a breech position (attempt an external cephalic version).  · It is safe to try a  with twins.  ·  may not be successful if your baby weights 8.8 lb (4 kg) or more. However, weight predictions are not always accurate and should not be used alone to decide if  is right for you.  · There is an increased failure rate  if the time between the  delivery and  is less than 19 months.  · Your health care provider may advise against a  if you have preeclampsia (high blood pressure, protein in the urine, and swelling of face and extremities).  ·  is often successful if you previously gave birth vaginally.  ·  is often successful when the labor starts spontaneously before the due date.  · Delivering a baby through a  is similar to having a normal spontaneous vaginal delivery.  This information is not intended to replace advice given to you by your health care provider. Make sure you discuss any questions you have with your health care provider.  Document Released: 2008 Document Revised: 2017 Document Reviewed: 2014  [employer] Interactive Patient Education © 2017 [employer]

## 2018-05-23 ENCOUNTER — ROUTINE PRENATAL (OUTPATIENT)
Dept: OBSTETRICS AND GYNECOLOGY | Facility: CLINIC | Age: 33
End: 2018-05-23

## 2018-05-23 VITALS — WEIGHT: 162 LBS | DIASTOLIC BLOOD PRESSURE: 70 MMHG | BODY MASS INDEX: 30.11 KG/M2 | SYSTOLIC BLOOD PRESSURE: 122 MMHG

## 2018-05-23 DIAGNOSIS — Z36.85 ANTENATAL SCREENING FOR STREPTOCOCCUS B: Primary | ICD-10-CM

## 2018-05-23 DIAGNOSIS — Z34.83 ENCOUNTER FOR SUPERVISION OF OTHER NORMAL PREGNANCY IN THIRD TRIMESTER: ICD-10-CM

## 2018-05-23 PROCEDURE — 99212 OFFICE O/P EST SF 10 MIN: CPT | Performed by: MIDWIFE

## 2018-05-23 NOTE — PROGRESS NOTES
Chief Complaint   Patient presents with   • Routine Prenatal Visit     GBS done today, no complaints        HPI: Elizabeth is a  currently at 35w0d who today reports the following:  Contractions - No; Leaking - No; Vaginal bleeding -  No; Swelling of extremities - No.  She does want TOLAC but hasn't signed consent.    ROS:   GI:   Nausea - No; Constipation - No   Neuro:  Headache - No; Visual disturbances - No.    EXAM:   Vitals:  See prenatal flowsheet, /70, Wt -2#   Abdomen:   See prenatal flowsheet, soft, nontender   Pelvic:  See prenatal flowsheet   Urine:  See prenatal flowsheet    MDM:  Impression: Supervision of low risk pregnancy  Previous C/S with planned   +ABS   Tests done today: GBS testing   Topics discussed: kick counts and fetal movement  labor signs and symptoms  TOLAC hospital papers given and she will bring them back next visit to review with practitioner   Tests next visit: none   Next visit: 1 weeks     This note was electronically signed.  Julianne Barone, ALFONSO  2018

## 2018-05-25 LAB — GP B STREP DNA SPEC QL NAA+PROBE: NEGATIVE

## 2018-06-01 ENCOUNTER — ROUTINE PRENATAL (OUTPATIENT)
Dept: OBSTETRICS AND GYNECOLOGY | Facility: CLINIC | Age: 33
End: 2018-06-01

## 2018-06-01 VITALS — BODY MASS INDEX: 31.04 KG/M2 | DIASTOLIC BLOOD PRESSURE: 72 MMHG | SYSTOLIC BLOOD PRESSURE: 130 MMHG | WEIGHT: 167 LBS

## 2018-06-01 DIAGNOSIS — Z34.83 ENCOUNTER FOR SUPERVISION OF OTHER NORMAL PREGNANCY IN THIRD TRIMESTER: ICD-10-CM

## 2018-06-01 PROCEDURE — 99212 OFFICE O/P EST SF 10 MIN: CPT | Performed by: MIDWIFE

## 2018-06-01 NOTE — PROGRESS NOTES
Chief Complaint   Patient presents with   • Routine Prenatal Visit     Doing well; having a few contractions       HPI: Elizabeth is a  currently at 36w2d who today reports the following:  Contractions - YES - but less than 4/hour AND no associated change in vaginal discharge; Leaking - No; Vaginal bleeding -  No; Swelling of extremities - No. Baby is active. She read over TOLAC consent but left them in the car. She does want TOLAC.    ROS:   GI:   Nausea - No; Constipation - No   Neuro:  Headache - No; Visual disturbances - No.    EXAM:   Vitals:  See prenatal flowsheet, /72, Wt +5#   Abdomen:   See prenatal flowsheet, soft, nontender   Pelvic:  See prenatal flowsheet   Urine:  See prenatal flowsheet    MDM:  Impression: Supervision of low risk pregnancy  Previous C/S with planned    Tests done today: none   Topics discussed: kick counts and fetal movement  labor signs and symptoms  Advised we will wait for spontaneous labor. She is to bring TOLAC papers in to be signed   GBS negative   Tests next visit: none   Next visit: 1 weeks     This note was electronically signed.  Julianne Barone, ALFONSO  2018

## 2018-06-07 ENCOUNTER — ROUTINE PRENATAL (OUTPATIENT)
Dept: OBSTETRICS AND GYNECOLOGY | Facility: CLINIC | Age: 33
End: 2018-06-07

## 2018-06-07 VITALS — DIASTOLIC BLOOD PRESSURE: 66 MMHG | WEIGHT: 164 LBS | BODY MASS INDEX: 30.49 KG/M2 | SYSTOLIC BLOOD PRESSURE: 126 MMHG

## 2018-06-07 DIAGNOSIS — O09.93 ENCOUNTER FOR SUPERVISION OF HIGH RISK PREGNANCY IN THIRD TRIMESTER, ANTEPARTUM: ICD-10-CM

## 2018-06-07 DIAGNOSIS — O32.9XX0 MALPOSITION OF FETUS, SINGLE OR UNSPECIFIED FETUS: Primary | ICD-10-CM

## 2018-06-07 DIAGNOSIS — O34.219 DESIRES VBAC (VAGINAL BIRTH AFTER CESAREAN) TRIAL: ICD-10-CM

## 2018-06-07 PROCEDURE — 99214 OFFICE O/P EST MOD 30 MIN: CPT | Performed by: OBSTETRICS & GYNECOLOGY

## 2018-06-07 NOTE — PROGRESS NOTES
Chief Complaint   Patient presents with   • Routine Prenatal Visit       HPI: Elizabeth is a  currently at 37w1d who today reports the following:  Contractions - No; Leaking - No; Vaginal bleeding -  No; Heartburn - No.  Pt without complaints.  Pt with occ contractions.  Pt with two previous SVDs then csection last delivery secondary to breech presentation.  Pt desires TOLAC.  Pt reports Dr. Emery was going to change due date; however no mention noted in notes and records reviewed; pt reports uncertain LMP.  Pt had first scan at [facility] 16 weeks; pt informed this is most accurate for dating pregnancy.  ROS:   GI:   Nausea - No; Constipation - No; Diarrhea - No.   Neuro:  Headache - No; Visual disturbances - No.    EXAM:   Vitals:  See prenatal flowsheet as noted and reviewed   Abdomen:   See prenatal flowsheet as noted and reviewed   Pelvic:  See prenatal flowsheet as noted and reviewed   Urine:  See prenatal flowsheet as noted and reviewed     Lab Results   Component Value Date    ABO O 2017    RH Negative 2017    ABSCRN See Final Results 2017    ABSCRN Positive (A) 2017       MDM:  Impression: Supervision of high risk pregnancy  Desires TOLAC   Questionable presentation  Desires tubal   Tests done today: U/S for presentation; scan confirmed infant in vtx presentation   Topics discussed: kick counts and fetal movement  labor signs and symptoms  PIH precautions   Vaginal birth () was discussed today with Elizabeth.  Success for  is dependant upon the reason for the first .  If the first  was done for a non-repeating cause (breech, fetal intolerance to labor, etc) the success rate is ~ 80%. If the first  was done for a repeating cause (failure to progress in labor), the risk of success is much lower (~ 50%).   carries risks that cannot be eliminated.  The greatest risk is for uterine rupture.  With a prior low low transverse uterine incision, this  likelihood of rupture is approximately 0.5-1.5%.  Should a rupture occur, risk to both mother and fetus exist.  The greatest risks are those of the fetus.  Should rupture occur with fetal expulsion outside of the uterus the risk of fetal death and anoxic brain injury occur.  This can result in long term  injury including cerebral palsy.  Maternal risk include uterine rupture with possible need for hysterectomy, and extension of the rupture into the bladder or ureter.  Additionally, the risk of infection and need for blood transfusion may be increased with a failed .  The risk of rupture may be higher with factors such as short interval between deliveries, prior one layer closure and induced labor.  As compared to repeat , the benefits of a successful  include lower rates of infection, less blood loss and quicker return to function.  Additionally, with successful , often the  transitions more quickly to extra-uterine life.  I explained to Elizabeth that  is elective choice.  Should she choose , she can change her mind and opt for a repeat  at any point.  Should she choose an elective repeat , it can be scheduled no sooner than 39 weeks gestation.  All questions were answered.  Pt to sign forms today.  Discussed tubal ligation with risks vs benefits and failure rate; tubal papers in chart.  Long discussion with patient and significant other regarding dating of pregnancy.   Tests next visit: none   Total time spent today with Elizabeth  was 30 minutes (level 4).  Greater than 50% of the time was spent face to face coordinating and planning care, answering her questions and counseling regarding pathophysiology of her presenting problem along with plans for any diagnositc work-up and treatment.  This note was electronically signed.  Jaimie Arita M.D.

## 2018-06-15 ENCOUNTER — ROUTINE PRENATAL (OUTPATIENT)
Dept: OBSTETRICS AND GYNECOLOGY | Facility: CLINIC | Age: 33
End: 2018-06-15

## 2018-06-15 VITALS — SYSTOLIC BLOOD PRESSURE: 126 MMHG | DIASTOLIC BLOOD PRESSURE: 64 MMHG | BODY MASS INDEX: 30.49 KG/M2 | WEIGHT: 164 LBS

## 2018-06-15 DIAGNOSIS — Z34.83 ENCOUNTER FOR SUPERVISION OF OTHER NORMAL PREGNANCY IN THIRD TRIMESTER: ICD-10-CM

## 2018-06-15 PROCEDURE — 99213 OFFICE O/P EST LOW 20 MIN: CPT | Performed by: NURSE PRACTITIONER

## 2018-06-15 NOTE — PROGRESS NOTES
Chief Complaint   Patient presents with   • Routine Prenatal Visit     c/o [place] contractions and pelvic pressure         HPI  , 38w2d reports doing well - good FM   Some contractions and pressure - no vaginal bleeding or fluid leaking     ROS  /64   Wt 74.4 kg (164 lb)   LMP 2017 (Approximate)   BMI 30.49 kg/m²  -See Prenatal Assessment    ROS:  GI: Nausea - No; Constipation - No; Diarrhea - No    Neuro: Headache - No; Visual change - No      EXAM  General Appearance: pleasant   Lungs: Breathing unlabored  Abdomen:  See flow sheet for Fundal ht, FM, FHT's  LE: Neg edema  V/E % -1-2/cephalic    MDM  Impression:  Problems/Risks: Pregnancy with Active Problems(s) &/or Complication(s)  Previous C/S   desires    Tests done today: none   Topics discussed: S/S labor and adeq FM/Kick Counts  encouraged questions - call prn    Tests next visit: none     OB History      Para Term  AB Living    4 3 3 0 0 3    SAB TAB Ectopic Molar Multiple Live Births    0 0 0 0 0 3        Obstetric Comments    New paternity for pregnancy #4          Past Medical History:   Diagnosis Date   • 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, unspecified trimester, fetus 1 2017    POSITIVE ANTIBODY SCREEN   • Asthma     as a child   • History of blood transfusion 2009    5 weeks postpartum   • Rh negative status during pregnancy 2017       Past Surgical History:   Procedure Laterality Date   •  SECTION  03/15/2013       Family History   Problem Relation Age of Onset   • No Known Problems Father    • No Known Problems Mother    • No Known Problems Brother    • No Known Problems Sister    • No Known Problems Son    • No Known Problems Daughter    • No Known Problems Paternal Grandfather    • No Known Problems Paternal Grandmother    • No Known Problems Maternal Grandmother    • No Known Problems Maternal Grandfather        Social History     Social History   • Marital status: Single      Spouse name: N/A   • Number of children: N/A   • Years of education: N/A     Occupational History   • Not on file.     Social History Main Topics   • Smoking status: Current Every Day Smoker     Packs/day: 0.25     Types: Cigarettes   • Smokeless tobacco: Never Used   • Alcohol use No   • Drug use: No   • Sexual activity: Yes     Partners: Male     Birth control/ protection: None     Other Topics Concern   • Not on file     Social History Narrative   • No narrative on file

## 2018-06-21 ENCOUNTER — LAB (OUTPATIENT)
Dept: LAB | Facility: HOSPITAL | Age: 33
End: 2018-06-21
Attending: OBSTETRICS & GYNECOLOGY

## 2018-06-21 ENCOUNTER — PREP FOR SURGERY (OUTPATIENT)
Dept: OTHER | Facility: HOSPITAL | Age: 33
End: 2018-06-21

## 2018-06-21 ENCOUNTER — HOSPITAL ENCOUNTER (OUTPATIENT)
Facility: HOSPITAL | Age: 33
Setting detail: SURGERY ADMIT
End: 2018-06-21
Attending: OBSTETRICS & GYNECOLOGY | Admitting: OBSTETRICS & GYNECOLOGY

## 2018-06-21 ENCOUNTER — ROUTINE PRENATAL (OUTPATIENT)
Dept: OBSTETRICS AND GYNECOLOGY | Facility: CLINIC | Age: 33
End: 2018-06-21

## 2018-06-21 VITALS — WEIGHT: 163 LBS | SYSTOLIC BLOOD PRESSURE: 120 MMHG | DIASTOLIC BLOOD PRESSURE: 60 MMHG | BODY MASS INDEX: 30.3 KG/M2

## 2018-06-21 DIAGNOSIS — Z3A.39 39 WEEKS GESTATION OF PREGNANCY: ICD-10-CM

## 2018-06-21 DIAGNOSIS — O09.93 ENCOUNTER FOR SUPERVISION OF HIGH RISK PREGNANCY IN THIRD TRIMESTER, ANTEPARTUM: Primary | ICD-10-CM

## 2018-06-21 DIAGNOSIS — Z3A.39 39 WEEKS GESTATION OF PREGNANCY: Primary | ICD-10-CM

## 2018-06-21 LAB
BASOPHILS # BLD AUTO: 0.03 10*3/MM3 (ref 0–0.2)
BASOPHILS NFR BLD AUTO: 0.3 % (ref 0–2.5)
BILIRUB UR QL STRIP: NEGATIVE
CLARITY UR: CLEAR
COLOR UR: YELLOW
DEPRECATED RDW RBC AUTO: 43.8 FL (ref 37–54)
EOSINOPHIL # BLD AUTO: 0.07 10*3/MM3 (ref 0–0.7)
EOSINOPHIL NFR BLD AUTO: 0.8 % (ref 0–7)
ERYTHROCYTE [DISTWIDTH] IN BLOOD BY AUTOMATED COUNT: 12.6 % (ref 11.5–14.5)
GLUCOSE UR STRIP-MCNC: NEGATIVE MG/DL
HCT VFR BLD AUTO: 36.5 % (ref 37–47)
HGB BLD-MCNC: 12.9 G/DL (ref 12–16)
HGB UR QL STRIP.AUTO: NEGATIVE
IMM GRANULOCYTES # BLD: 0.03 10*3/MM3 (ref 0–0.06)
IMM GRANULOCYTES NFR BLD: 0.3 % (ref 0–0.6)
KETONES UR QL STRIP: NEGATIVE
LEUKOCYTE ESTERASE UR QL STRIP.AUTO: NEGATIVE
LYMPHOCYTES # BLD AUTO: 2 10*3/MM3 (ref 0.6–3.4)
LYMPHOCYTES NFR BLD AUTO: 21.9 % (ref 10–50)
MCH RBC QN AUTO: 33.2 PG (ref 27–31)
MCHC RBC AUTO-ENTMCNC: 35.3 G/DL (ref 30–37)
MCV RBC AUTO: 94.1 FL (ref 81–99)
MONOCYTES # BLD AUTO: 0.61 10*3/MM3 (ref 0–0.9)
MONOCYTES NFR BLD AUTO: 6.7 % (ref 0–12)
NEUTROPHILS # BLD AUTO: 6.41 10*3/MM3 (ref 2–6.9)
NEUTROPHILS NFR BLD AUTO: 70 % (ref 37–80)
NITRITE UR QL STRIP: NEGATIVE
NRBC BLD MANUAL-RTO: 0 /100 WBC (ref 0–0)
PH UR STRIP.AUTO: 7 [PH] (ref 5–8)
PLATELET # BLD AUTO: 222 10*3/MM3 (ref 130–400)
PMV BLD AUTO: 11.4 FL (ref 6–12)
PROT UR QL STRIP: NEGATIVE
RBC # BLD AUTO: 3.88 10*6/MM3 (ref 4.2–5.4)
RBC MORPH BLD: NORMAL
SMALL PLATELETS BLD QL SMEAR: ADEQUATE
SP GR UR STRIP: 1.02 (ref 1–1.03)
UROBILINOGEN UR QL STRIP: ABNORMAL
WBC MORPH BLD: NORMAL
WBC NRBC COR # BLD: 9.15 10*3/MM3 (ref 4.8–10.8)

## 2018-06-21 PROCEDURE — 85025 COMPLETE CBC W/AUTO DIFF WBC: CPT

## 2018-06-21 PROCEDURE — S0260 H&P FOR SURGERY: HCPCS | Performed by: OBSTETRICS & GYNECOLOGY

## 2018-06-21 PROCEDURE — 86850 RBC ANTIBODY SCREEN: CPT

## 2018-06-21 PROCEDURE — 86902 BLOOD TYPE ANTIGEN DONOR EA: CPT

## 2018-06-21 PROCEDURE — 86900 BLOOD TYPING SEROLOGIC ABO: CPT

## 2018-06-21 PROCEDURE — 86870 RBC ANTIBODY IDENTIFICATION: CPT

## 2018-06-21 PROCEDURE — 81003 URINALYSIS AUTO W/O SCOPE: CPT

## 2018-06-21 PROCEDURE — 86922 COMPATIBILITY TEST ANTIGLOB: CPT

## 2018-06-21 PROCEDURE — 86901 BLOOD TYPING SEROLOGIC RH(D): CPT

## 2018-06-21 PROCEDURE — 99213 OFFICE O/P EST LOW 20 MIN: CPT | Performed by: OBSTETRICS & GYNECOLOGY

## 2018-06-21 PROCEDURE — 85007 BL SMEAR W/DIFF WBC COUNT: CPT

## 2018-06-21 PROCEDURE — 86920 COMPATIBILITY TEST SPIN: CPT

## 2018-06-21 PROCEDURE — 36415 COLL VENOUS BLD VENIPUNCTURE: CPT

## 2018-06-21 RX ORDER — CARBOPROST TROMETHAMINE 250 UG/ML
250 INJECTION, SOLUTION INTRAMUSCULAR AS NEEDED
Status: CANCELLED | OUTPATIENT
Start: 2018-06-21

## 2018-06-21 RX ORDER — FAMOTIDINE 10 MG/ML
20 INJECTION, SOLUTION INTRAVENOUS ONCE
Status: CANCELLED | OUTPATIENT
Start: 2018-06-21 | End: 2018-06-21

## 2018-06-21 RX ORDER — MORPHINE SULFATE 2 MG/ML
6 INJECTION, SOLUTION INTRAMUSCULAR; INTRAVENOUS
Status: CANCELLED | OUTPATIENT
Start: 2018-06-21 | End: 2018-07-01

## 2018-06-21 RX ORDER — SODIUM CHLORIDE 0.9 % (FLUSH) 0.9 %
1-10 SYRINGE (ML) INJECTION AS NEEDED
Status: CANCELLED | OUTPATIENT
Start: 2018-06-21

## 2018-06-21 RX ORDER — PROMETHAZINE HYDROCHLORIDE 25 MG/ML
25 INJECTION, SOLUTION INTRAMUSCULAR; INTRAVENOUS 4 TIMES DAILY PRN
Status: CANCELLED | OUTPATIENT
Start: 2018-06-21

## 2018-06-21 RX ORDER — TRISODIUM CITRATE DIHYDRATE AND CITRIC ACID MONOHYDRATE 500; 334 MG/5ML; MG/5ML
30 SOLUTION ORAL ONCE
Status: CANCELLED | OUTPATIENT
Start: 2018-06-21 | End: 2018-06-21

## 2018-06-21 RX ORDER — PROMETHAZINE HYDROCHLORIDE 12.5 MG/1
12.5 SUPPOSITORY RECTAL EVERY 6 HOURS PRN
Status: CANCELLED | OUTPATIENT
Start: 2018-06-21

## 2018-06-21 RX ORDER — ONDANSETRON 4 MG/1
4 TABLET, ORALLY DISINTEGRATING ORAL EVERY 6 HOURS PRN
Status: CANCELLED | OUTPATIENT
Start: 2018-06-21

## 2018-06-21 RX ORDER — METHYLERGONOVINE MALEATE 0.2 MG/ML
200 INJECTION INTRAVENOUS ONCE AS NEEDED
Status: CANCELLED | OUTPATIENT
Start: 2018-06-21

## 2018-06-21 RX ORDER — PROMETHAZINE HYDROCHLORIDE 12.5 MG/1
12.5 TABLET ORAL EVERY 6 HOURS PRN
Status: CANCELLED | OUTPATIENT
Start: 2018-06-21

## 2018-06-21 RX ORDER — ZOLPIDEM TARTRATE 5 MG/1
10 TABLET ORAL NIGHTLY PRN
Status: CANCELLED | OUTPATIENT
Start: 2018-06-21 | End: 2018-07-01

## 2018-06-21 RX ORDER — ONDANSETRON 2 MG/ML
4 INJECTION INTRAMUSCULAR; INTRAVENOUS EVERY 6 HOURS PRN
Status: CANCELLED | OUTPATIENT
Start: 2018-06-21

## 2018-06-21 RX ORDER — ONDANSETRON 4 MG/1
4 TABLET, FILM COATED ORAL EVERY 6 HOURS PRN
Status: CANCELLED | OUTPATIENT
Start: 2018-06-21

## 2018-06-21 RX ORDER — MISOPROSTOL 200 UG/1
800 TABLET ORAL AS NEEDED
Status: CANCELLED | OUTPATIENT
Start: 2018-06-21

## 2018-06-21 RX ORDER — CLINDAMYCIN PHOSPHATE 900 MG/50ML
900 INJECTION, SOLUTION INTRAVENOUS ONCE
Status: CANCELLED | OUTPATIENT
Start: 2018-06-21 | End: 2018-06-21

## 2018-06-21 RX ORDER — LIDOCAINE HYDROCHLORIDE 10 MG/ML
5 INJECTION, SOLUTION EPIDURAL; INFILTRATION; INTRACAUDAL; PERINEURAL AS NEEDED
Status: CANCELLED | OUTPATIENT
Start: 2018-06-21

## 2018-06-21 RX ORDER — PROMETHAZINE HYDROCHLORIDE 25 MG/ML
12.5 INJECTION, SOLUTION INTRAMUSCULAR; INTRAVENOUS 4 TIMES DAILY PRN
Status: CANCELLED | OUTPATIENT
Start: 2018-06-21

## 2018-06-21 NOTE — H&P
[facility]  Elizabeth Cedillo  : 1985  MRN: [id number]  CSN: [id number]    History and Physical    Subjective   Elizabeth Cedillo is a 33 y.o. year old  with an Estimated Date of Delivery: 18 scheduled for  delivery due to previous C/S - declines .  Her placental location is posterior.  She is planning for sterilization at the time of the .    Prenatal care has been with [facility].  It has been benign.    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      Current Outpatient Prescriptions on File Prior to Encounter   Medication Sig Dispense Refill   • Prenatal Vit-Fe Fumarate-FA (PRENATAL, CLASSIC, VITAMIN) 28-0.8 MG tablet tablet Take  by mouth Daily.       Allergies   Allergen Reactions   • Ceclor [Cefaclor] Shortness Of Breath and Rash   • Sulfa Antibiotics Shortness Of Breath and Rash     Past Surgical History:   Procedure Laterality Date   •  SECTION  03/15/2013     Family History   Problem Relation Age of Onset   • No Known Problems Father    • No Known Problems Mother    • No Known Problems Brother    • No  Known Problems Sister    • No Known Problems Son    • No Known Problems Daughter    • No Known Problems Paternal Grandfather    • No Known Problems Paternal Grandmother    • No Known Problems Maternal Grandmother    • No Known Problems Maternal Grandfather      Social History     Social History   • Marital status: Single     Social History Main Topics   • Smoking status: Current Every Day Smoker     Packs/day: 0.25     Types: Cigarettes   • Smokeless tobacco: Never Used   • Alcohol use No   • Drug use: No   • Sexual activity: Yes     Partners: Male     Birth control/ protection: None     Other Topics Concern   • Not on file     Review of Systems  The following systems were reviewed and negative:  constitution, eyes, ENT, respiratory, cardiovascular, gastrointestinal, genitourinary, integument, breast, hematologic / lymphatic, musculoskeletal, neurological, behavioral/psych, endocrine and allergies / immunologic     Objective  Recent Vitals       6/7/2018 6/15/2018 6/21/2018       BP: 126/66 126/64 120/60     Weight: 74.4 kg (164 lb) 74.4 kg (164 lb) 73.9 kg (163 lb)         Physical Exam:  General Appearance: alert, appears stated age and cooperative  Head: normocephalic, without obvious abnormality and atraumatic  Eyes: lids and lashes normal, conjunctivae and sclerae normal, no icterus, no pallor and corneas clear  Lungs: clear to auscultation, respirations regular, respirations even and respirations unlabored  Heart: regular rhythm and normal rate and normal S1, S2  Abdomen: no hepatomegaly, no splenomegaly, soft non-tender, no guarding, no rebound tenderness and uterus gravid and nontender  Extremities: moves extremities well, no edema, no cyanosis and no redness  Skin: no bleeding, bruising or rash and no lesions noted  Psych: normal mood and affect, oriented to person, time and place, thought content organized and appropriate judgment    Prenatal Labs  Lab Results   Component Value Date    HGB 12.9 06/21/2018     HEPBSAG Negative 2017    ABSCRN Positive 2018    [id number] Non Reactive 2017       Recent Labs  Lab Results   Component Value Date    HGB 12.9 2018    HCT 36.5 (L) 2018    WBC 9.15 2018     2018           Assessment   1. IUP with an Estimated Date of Delivery: 18.  2. Planned  section for previous C/S - declines .  3. Desires permanent surgical sterilization.     Plan   1. Repeat  with sterilization.  2. ABx and DVT prophylaxis.  3. Risks, complications, benefits, and other alternatives discussed.  4. All questions answered and pt in agreement with plan.    Jaimie Arita M.D.  2018

## 2018-06-21 NOTE — PROGRESS NOTES
Chief Complaint   Patient presents with   • Routine Prenatal Visit       HPI: Elizabeth is a  currently at 39w1d who today reports the following:  Contractions - No; Leaking - No; Vaginal bleeding -  No; Heartburn - No.  Pt reports no to minimal contractions.  Pt with good fetal movement.  Pt desires to proceed with repeat csection with tubal given no labor at this point.  ROS:   GI:   Nausea - No; Constipation - No; Diarrhea - No.   Neuro:  Headache - No; Visual disturbances - No.    EXAM:   Vitals:  See prenatal flowsheet as noted and reviewed   Abdomen:   See prenatal flowsheet as noted and reviewed   Pelvic:  See prenatal flowsheet as noted and reviewed   Urine:  See prenatal flowsheet as noted and reviewed     Lab Results   Component Value Date    ABO O 2017    RH Negative 2017    ABSCRN See Final Results 2017    ABSCRN Positive (A) 2017       MDM:  Impression: Supervision of high risk pregnancy  Previous C/S with planned repeat C/S   Desires permanent surgical sterilization  +Margy antibody - father of baby negative   Tests done today: none   Topics discussed: kick counts and fetal movement  labor signs and symptoms  PIH precautions   Pt to schedule repeat csection with tubal in am; risks, complications, benefits, and other alternatives discussed.  Failure rate of tubal also discussed.  Pt desires to schedule.  All questions answered.  Pt and significant other in agreement with plan.   Tests next visit: none     This note was electronically signed.  Jaimie Arita M.D.

## 2018-06-22 ENCOUNTER — HOSPITAL ENCOUNTER (INPATIENT)
Facility: HOSPITAL | Age: 33
LOS: 2 days | Discharge: HOME | End: 2018-06-24
Attending: OBSTETRICS & GYNECOLOGY | Admitting: OBSTETRICS & GYNECOLOGY

## 2018-06-22 ENCOUNTER — ANESTHESIA (OUTPATIENT)
Dept: LABOR AND DELIVERY | Facility: HOSPITAL | Age: 33
End: 2018-06-22

## 2018-06-22 ENCOUNTER — ANESTHESIA EVENT (OUTPATIENT)
Dept: LABOR AND DELIVERY | Facility: HOSPITAL | Age: 33
End: 2018-06-22

## 2018-06-22 DIAGNOSIS — Z3A.39 39 WEEKS GESTATION OF PREGNANCY: ICD-10-CM

## 2018-06-22 DIAGNOSIS — Z98.891 S/P REPEAT LOW TRANSVERSE C-SECTION: Primary | ICD-10-CM

## 2018-06-22 PROBLEM — Z34.90 PREGNANCY: Status: ACTIVE | Noted: 2018-06-22

## 2018-06-22 LAB
ABO GROUP BLD: NORMAL
ANTI-D: NORMAL
ANTI-K: NORMAL
BLD GP AB SCN SERPL QL: POSITIVE
RH BLD: NEGATIVE
T&S EXPIRATION DATE: NORMAL

## 2018-06-22 PROCEDURE — 59025 FETAL NON-STRESS TEST: CPT

## 2018-06-22 PROCEDURE — 59515 CESAREAN DELIVERY: CPT | Performed by: OBSTETRICS & GYNECOLOGY

## 2018-06-22 PROCEDURE — 25010000002 MORPHINE PER 10 MG: Performed by: NURSE ANESTHETIST, CERTIFIED REGISTERED

## 2018-06-22 PROCEDURE — 25010000002 GENTAMICIN PER 80 MG: Performed by: OBSTETRICS & GYNECOLOGY

## 2018-06-22 PROCEDURE — 51703 INSERT BLADDER CATH COMPLEX: CPT

## 2018-06-22 PROCEDURE — 0UL70CZ OCCLUSION OF BILATERAL FALLOPIAN TUBES WITH EXTRALUMINAL DEVICE, OPEN APPROACH: ICD-10-PCS | Performed by: OBSTETRICS & GYNECOLOGY

## 2018-06-22 PROCEDURE — 25810000003 DEXTROSE-NACL PER 500 ML: Performed by: OBSTETRICS & GYNECOLOGY

## 2018-06-22 PROCEDURE — 25010000002 PHENYLEPHRINE PER 1 ML: Performed by: NURSE ANESTHETIST, CERTIFIED REGISTERED

## 2018-06-22 PROCEDURE — 25010000002 MORPHINE SULFATE (PF) 2 MG/ML SOLUTION: Performed by: OBSTETRICS & GYNECOLOGY

## 2018-06-22 PROCEDURE — 58611 LIGATE OVIDUCT(S) ADD-ON: CPT | Performed by: OBSTETRICS & GYNECOLOGY

## 2018-06-22 PROCEDURE — G0463 HOSPITAL OUTPT CLINIC VISIT: HCPCS

## 2018-06-22 PROCEDURE — 25010000002 ONDANSETRON PER 1 MG: Performed by: NURSE ANESTHETIST, CERTIFIED REGISTERED

## 2018-06-22 PROCEDURE — 25010000002 ONDANSETRON PER 1 MG: Performed by: OBSTETRICS & GYNECOLOGY

## 2018-06-22 PROCEDURE — 25010000002 FENTANYL CITRATE (PF) 100 MCG/2ML SOLUTION: Performed by: NURSE ANESTHETIST, CERTIFIED REGISTERED

## 2018-06-22 DEVICE — CLIP FALLOP FILSHIE TI PR STRL BX/20: Type: IMPLANTABLE DEVICE | Site: FALLOPIAN TUBE | Status: FUNCTIONAL

## 2018-06-22 RX ORDER — SIMETHICONE 80 MG
80 TABLET,CHEWABLE ORAL 4 TIMES DAILY PRN
Status: DISCONTINUED | OUTPATIENT
Start: 2018-06-22 | End: 2018-06-24 | Stop reason: HOSPADM

## 2018-06-22 RX ORDER — NALOXONE HCL 0.4 MG/ML
0.4 VIAL (ML) INJECTION
Status: ACTIVE | OUTPATIENT
Start: 2018-06-22 | End: 2018-06-23

## 2018-06-22 RX ORDER — OXYTOCIN 10 [USP'U]/ML
INJECTION, SOLUTION INTRAMUSCULAR; INTRAVENOUS AS NEEDED
Status: DISCONTINUED | OUTPATIENT
Start: 2018-06-22 | End: 2018-06-22 | Stop reason: SURG

## 2018-06-22 RX ORDER — PROMETHAZINE HYDROCHLORIDE 12.5 MG/1
12.5 TABLET ORAL EVERY 6 HOURS PRN
Status: DISCONTINUED | OUTPATIENT
Start: 2018-06-22 | End: 2018-06-22 | Stop reason: HOSPADM

## 2018-06-22 RX ORDER — LIDOCAINE HYDROCHLORIDE 10 MG/ML
5 INJECTION, SOLUTION EPIDURAL; INFILTRATION; INTRACAUDAL; PERINEURAL AS NEEDED
Status: DISCONTINUED | OUTPATIENT
Start: 2018-06-22 | End: 2018-06-22 | Stop reason: HOSPADM

## 2018-06-22 RX ORDER — METHYLERGONOVINE MALEATE 0.2 MG/ML
200 INJECTION INTRAVENOUS ONCE AS NEEDED
Status: DISCONTINUED | OUTPATIENT
Start: 2018-06-22 | End: 2018-06-24 | Stop reason: HOSPADM

## 2018-06-22 RX ORDER — ONDANSETRON 2 MG/ML
4 INJECTION INTRAMUSCULAR; INTRAVENOUS EVERY 6 HOURS PRN
Status: DISCONTINUED | OUTPATIENT
Start: 2018-06-22 | End: 2018-06-24 | Stop reason: HOSPADM

## 2018-06-22 RX ORDER — ONDANSETRON 2 MG/ML
4 INJECTION INTRAMUSCULAR; INTRAVENOUS ONCE AS NEEDED
Status: DISCONTINUED | OUTPATIENT
Start: 2018-06-22 | End: 2018-06-24 | Stop reason: HOSPADM

## 2018-06-22 RX ORDER — MORPHINE SULFATE 2 MG/ML
2 INJECTION, SOLUTION INTRAMUSCULAR; INTRAVENOUS EVERY 4 HOURS PRN
Status: DISCONTINUED | OUTPATIENT
Start: 2018-06-22 | End: 2018-06-24 | Stop reason: HOSPADM

## 2018-06-22 RX ORDER — BUPIVACAINE HYDROCHLORIDE 7.5 MG/ML
INJECTION, SOLUTION INTRASPINAL AS NEEDED
Status: DISCONTINUED | OUTPATIENT
Start: 2018-06-22 | End: 2018-06-22 | Stop reason: SURG

## 2018-06-22 RX ORDER — DOCUSATE SODIUM 100 MG/1
100 CAPSULE, LIQUID FILLED ORAL 2 TIMES DAILY PRN
Status: DISCONTINUED | OUTPATIENT
Start: 2018-06-22 | End: 2018-06-24 | Stop reason: HOSPADM

## 2018-06-22 RX ORDER — ONDANSETRON 2 MG/ML
INJECTION INTRAMUSCULAR; INTRAVENOUS AS NEEDED
Status: DISCONTINUED | OUTPATIENT
Start: 2018-06-22 | End: 2018-06-22 | Stop reason: SURG

## 2018-06-22 RX ORDER — FENTANYL CITRATE 50 UG/ML
INJECTION, SOLUTION INTRAMUSCULAR; INTRAVENOUS AS NEEDED
Status: DISCONTINUED | OUTPATIENT
Start: 2018-06-22 | End: 2018-06-22 | Stop reason: SURG

## 2018-06-22 RX ORDER — OXYCODONE HYDROCHLORIDE AND ACETAMINOPHEN 5; 325 MG/1; MG/1
2 TABLET ORAL EVERY 4 HOURS PRN
Status: DISCONTINUED | OUTPATIENT
Start: 2018-06-23 | End: 2018-06-24 | Stop reason: HOSPADM

## 2018-06-22 RX ORDER — ONDANSETRON 4 MG/1
4 TABLET, FILM COATED ORAL EVERY 6 HOURS PRN
Status: DISCONTINUED | OUTPATIENT
Start: 2018-06-22 | End: 2018-06-24 | Stop reason: HOSPADM

## 2018-06-22 RX ORDER — TRISODIUM CITRATE DIHYDRATE AND CITRIC ACID MONOHYDRATE 500; 334 MG/5ML; MG/5ML
30 SOLUTION ORAL ONCE
Status: COMPLETED | OUTPATIENT
Start: 2018-06-22 | End: 2018-06-22

## 2018-06-22 RX ORDER — MORPHINE SULFATE 2 MG/ML
1 INJECTION, SOLUTION INTRAMUSCULAR; INTRAVENOUS EVERY 4 HOURS PRN
Status: DISCONTINUED | OUTPATIENT
Start: 2018-06-22 | End: 2018-06-24 | Stop reason: HOSPADM

## 2018-06-22 RX ORDER — BISACODYL 10 MG
10 SUPPOSITORY, RECTAL RECTAL DAILY PRN
Status: DISCONTINUED | OUTPATIENT
Start: 2018-06-22 | End: 2018-06-24 | Stop reason: HOSPADM

## 2018-06-22 RX ORDER — ZOLPIDEM TARTRATE 5 MG/1
10 TABLET ORAL NIGHTLY PRN
Status: DISCONTINUED | OUTPATIENT
Start: 2018-06-22 | End: 2018-06-24 | Stop reason: HOSPADM

## 2018-06-22 RX ORDER — FAMOTIDINE 10 MG/ML
20 INJECTION, SOLUTION INTRAVENOUS ONCE
Status: COMPLETED | OUTPATIENT
Start: 2018-06-22 | End: 2018-06-22

## 2018-06-22 RX ORDER — MEPERIDINE HYDROCHLORIDE 50 MG/ML
50 INJECTION INTRAMUSCULAR; INTRAVENOUS; SUBCUTANEOUS ONCE AS NEEDED
Status: ACTIVE | OUTPATIENT
Start: 2018-06-22 | End: 2018-06-22

## 2018-06-22 RX ORDER — NALOXONE HCL 0.4 MG/ML
0.4 VIAL (ML) INJECTION
Status: DISCONTINUED | OUTPATIENT
Start: 2018-06-22 | End: 2018-06-24 | Stop reason: HOSPADM

## 2018-06-22 RX ORDER — PROMETHAZINE HYDROCHLORIDE 25 MG/ML
12.5 INJECTION, SOLUTION INTRAMUSCULAR; INTRAVENOUS 4 TIMES DAILY PRN
Status: DISCONTINUED | OUTPATIENT
Start: 2018-06-22 | End: 2018-06-22 | Stop reason: HOSPADM

## 2018-06-22 RX ORDER — CARBOPROST TROMETHAMINE 250 UG/ML
250 INJECTION, SOLUTION INTRAMUSCULAR AS NEEDED
Status: DISCONTINUED | OUTPATIENT
Start: 2018-06-22 | End: 2018-06-24 | Stop reason: HOSPADM

## 2018-06-22 RX ORDER — MORPHINE SULFATE 2 MG/ML
6 INJECTION, SOLUTION INTRAMUSCULAR; INTRAVENOUS
Status: DISCONTINUED | OUTPATIENT
Start: 2018-06-22 | End: 2018-06-24 | Stop reason: HOSPADM

## 2018-06-22 RX ORDER — MISOPROSTOL 200 UG/1
800 TABLET ORAL AS NEEDED
Status: DISCONTINUED | OUTPATIENT
Start: 2018-06-22 | End: 2018-06-24 | Stop reason: HOSPADM

## 2018-06-22 RX ORDER — PROMETHAZINE HYDROCHLORIDE 12.5 MG/1
12.5 SUPPOSITORY RECTAL EVERY 6 HOURS PRN
Status: DISCONTINUED | OUTPATIENT
Start: 2018-06-22 | End: 2018-06-24 | Stop reason: HOSPADM

## 2018-06-22 RX ORDER — DEXTROSE AND SODIUM CHLORIDE 5; .9 G/100ML; G/100ML
125 INJECTION, SOLUTION INTRAVENOUS CONTINUOUS
Status: DISCONTINUED | OUTPATIENT
Start: 2018-06-22 | End: 2018-06-24 | Stop reason: HOSPADM

## 2018-06-22 RX ORDER — ONDANSETRON 4 MG/1
4 TABLET, ORALLY DISINTEGRATING ORAL EVERY 6 HOURS PRN
Status: DISCONTINUED | OUTPATIENT
Start: 2018-06-22 | End: 2018-06-24 | Stop reason: HOSPADM

## 2018-06-22 RX ORDER — SODIUM CHLORIDE 0.9 % (FLUSH) 0.9 %
1-10 SYRINGE (ML) INJECTION AS NEEDED
Status: DISCONTINUED | OUTPATIENT
Start: 2018-06-22 | End: 2018-06-22 | Stop reason: HOSPADM

## 2018-06-22 RX ORDER — CLINDAMYCIN PHOSPHATE 900 MG/50ML
900 INJECTION, SOLUTION INTRAVENOUS ONCE
Status: DISCONTINUED | OUTPATIENT
Start: 2018-06-22 | End: 2018-06-24 | Stop reason: HOSPADM

## 2018-06-22 RX ORDER — BISACODYL 5 MG/1
10 TABLET, DELAYED RELEASE ORAL DAILY PRN
Status: DISCONTINUED | OUTPATIENT
Start: 2018-06-22 | End: 2018-06-24 | Stop reason: HOSPADM

## 2018-06-22 RX ORDER — PROMETHAZINE HYDROCHLORIDE 25 MG/ML
25 INJECTION, SOLUTION INTRAMUSCULAR; INTRAVENOUS 4 TIMES DAILY PRN
Status: DISCONTINUED | OUTPATIENT
Start: 2018-06-22 | End: 2018-06-22 | Stop reason: HOSPADM

## 2018-06-22 RX ORDER — NALBUPHINE HCL 10 MG/ML
2.5 AMPUL (ML) INJECTION EVERY 4 HOURS PRN
Status: ACTIVE | OUTPATIENT
Start: 2018-06-22 | End: 2018-06-23

## 2018-06-22 RX ORDER — MEPERIDINE HYDROCHLORIDE 25 MG/ML
12.5 INJECTION INTRAMUSCULAR; INTRAVENOUS; SUBCUTANEOUS
Status: ACTIVE | OUTPATIENT
Start: 2018-06-22 | End: 2018-06-23

## 2018-06-22 RX ORDER — PROMETHAZINE HYDROCHLORIDE 12.5 MG/1
12.5 SUPPOSITORY RECTAL EVERY 6 HOURS PRN
Status: DISCONTINUED | OUTPATIENT
Start: 2018-06-22 | End: 2018-06-22 | Stop reason: HOSPADM

## 2018-06-22 RX ORDER — PROMETHAZINE HYDROCHLORIDE 25 MG/1
25 SUPPOSITORY RECTAL ONCE AS NEEDED
Status: DISCONTINUED | OUTPATIENT
Start: 2018-06-22 | End: 2018-06-24 | Stop reason: HOSPADM

## 2018-06-22 RX ORDER — CLINDAMYCIN PHOSPHATE 900 MG/50ML
900 INJECTION, SOLUTION INTRAVENOUS ONCE
Status: COMPLETED | OUTPATIENT
Start: 2018-06-22 | End: 2018-06-22

## 2018-06-22 RX ORDER — NALBUPHINE HCL 10 MG/ML
2.5 AMPUL (ML) INJECTION
Status: DISCONTINUED | OUTPATIENT
Start: 2018-06-22 | End: 2018-06-24 | Stop reason: HOSPADM

## 2018-06-22 RX ORDER — PROMETHAZINE HYDROCHLORIDE 25 MG/1
25 TABLET ORAL ONCE AS NEEDED
Status: DISCONTINUED | OUTPATIENT
Start: 2018-06-22 | End: 2018-06-24 | Stop reason: HOSPADM

## 2018-06-22 RX ORDER — PROMETHAZINE HYDROCHLORIDE 25 MG/ML
12.5 INJECTION, SOLUTION INTRAMUSCULAR; INTRAVENOUS EVERY 6 HOURS PRN
Status: DISCONTINUED | OUTPATIENT
Start: 2018-06-22 | End: 2018-06-24 | Stop reason: HOSPADM

## 2018-06-22 RX ORDER — ONDANSETRON 2 MG/ML
4 INJECTION INTRAMUSCULAR; INTRAVENOUS ONCE AS NEEDED
Status: ACTIVE | OUTPATIENT
Start: 2018-06-22 | End: 2018-06-23

## 2018-06-22 RX ORDER — IBUPROFEN 600 MG/1
600 TABLET ORAL EVERY 6 HOURS PRN
Status: DISCONTINUED | OUTPATIENT
Start: 2018-06-23 | End: 2018-06-24 | Stop reason: HOSPADM

## 2018-06-22 RX ORDER — SODIUM CHLORIDE, SODIUM LACTATE, POTASSIUM CHLORIDE, CALCIUM CHLORIDE 600; 310; 30; 20 MG/100ML; MG/100ML; MG/100ML; MG/100ML
125 INJECTION, SOLUTION INTRAVENOUS CONTINUOUS
Status: DISCONTINUED | OUTPATIENT
Start: 2018-06-22 | End: 2018-06-22 | Stop reason: HOSPADM

## 2018-06-22 RX ORDER — ONDANSETRON 4 MG/1
4 TABLET, FILM COATED ORAL EVERY 8 HOURS PRN
Status: DISCONTINUED | OUTPATIENT
Start: 2018-06-22 | End: 2018-06-24 | Stop reason: HOSPADM

## 2018-06-22 RX ORDER — MORPHINE SULFATE 1 MG/ML
INJECTION, SOLUTION EPIDURAL; INTRATHECAL; INTRAVENOUS AS NEEDED
Status: DISCONTINUED | OUTPATIENT
Start: 2018-06-22 | End: 2018-06-22 | Stop reason: SURG

## 2018-06-22 RX ORDER — PROMETHAZINE HYDROCHLORIDE 25 MG/ML
6.25 INJECTION, SOLUTION INTRAMUSCULAR; INTRAVENOUS ONCE AS NEEDED
Status: DISCONTINUED | OUTPATIENT
Start: 2018-06-22 | End: 2018-06-24 | Stop reason: HOSPADM

## 2018-06-22 RX ORDER — DIPHENHYDRAMINE HCL 25 MG
25 CAPSULE ORAL EVERY 4 HOURS PRN
Status: DISCONTINUED | OUTPATIENT
Start: 2018-06-22 | End: 2018-06-24 | Stop reason: HOSPADM

## 2018-06-22 RX ORDER — IPRATROPIUM BROMIDE AND ALBUTEROL SULFATE 2.5; .5 MG/3ML; MG/3ML
3 SOLUTION RESPIRATORY (INHALATION) ONCE AS NEEDED
Status: DISCONTINUED | OUTPATIENT
Start: 2018-06-22 | End: 2018-06-24 | Stop reason: HOSPADM

## 2018-06-22 RX ORDER — PROMETHAZINE HYDROCHLORIDE 25 MG/1
25 TABLET ORAL EVERY 6 HOURS PRN
Status: DISCONTINUED | OUTPATIENT
Start: 2018-06-22 | End: 2018-06-24 | Stop reason: HOSPADM

## 2018-06-22 RX ADMIN — MORPHINE SULFATE 0.2 MG: 1 INJECTION EPIDURAL; INTRATHECAL; INTRAVENOUS at 13:44

## 2018-06-22 RX ADMIN — SODIUM CHLORIDE, POTASSIUM CHLORIDE, SODIUM LACTATE AND CALCIUM CHLORIDE: 600; 310; 30; 20 INJECTION, SOLUTION INTRAVENOUS at 14:00

## 2018-06-22 RX ADMIN — DEXTROSE AND SODIUM CHLORIDE 125 ML/HR: 5; 900 INJECTION, SOLUTION INTRAVENOUS at 18:02

## 2018-06-22 RX ADMIN — ONDANSETRON 8 MG: 2 INJECTION INTRAMUSCULAR; INTRAVENOUS at 13:42

## 2018-06-22 RX ADMIN — SODIUM CHLORIDE, POTASSIUM CHLORIDE, SODIUM LACTATE AND CALCIUM CHLORIDE: 600; 310; 30; 20 INJECTION, SOLUTION INTRAVENOUS at 14:22

## 2018-06-22 RX ADMIN — OXYTOCIN 20 UNITS: 10 INJECTION INTRAVENOUS at 14:22

## 2018-06-22 RX ADMIN — SODIUM CITRATE AND CITRIC ACID MONOHYDRATE 30 ML: 500; 334 SOLUTION ORAL at 13:23

## 2018-06-22 RX ADMIN — GENTAMICIN SULFATE 300 MG: 40 INJECTION, SOLUTION INTRAMUSCULAR; INTRAVENOUS at 13:18

## 2018-06-22 RX ADMIN — FENTANYL CITRATE 10 MCG: 50 INJECTION, SOLUTION INTRAMUSCULAR; INTRAVENOUS at 13:44

## 2018-06-22 RX ADMIN — PHENYLEPHRINE HYDROCHLORIDE 100 MCG: 10 INJECTION INTRAVENOUS at 13:56

## 2018-06-22 RX ADMIN — FAMOTIDINE 20 MG: 10 INJECTION INTRAVENOUS at 13:22

## 2018-06-22 RX ADMIN — CLINDAMYCIN PHOSPHATE 900 MG: 900 INJECTION, SOLUTION INTRAVENOUS at 13:18

## 2018-06-22 RX ADMIN — SODIUM CHLORIDE, POTASSIUM CHLORIDE, SODIUM LACTATE AND CALCIUM CHLORIDE 125 ML/HR: 600; 310; 30; 20 INJECTION, SOLUTION INTRAVENOUS at 10:50

## 2018-06-22 RX ADMIN — ONDANSETRON 4 MG: 2 INJECTION INTRAMUSCULAR; INTRAVENOUS at 19:37

## 2018-06-22 RX ADMIN — OXYTOCIN 20 UNITS: 10 INJECTION INTRAVENOUS at 14:00

## 2018-06-22 RX ADMIN — MORPHINE SULFATE 2 MG: 2 INJECTION, SOLUTION INTRAMUSCULAR; INTRAVENOUS at 17:57

## 2018-06-22 RX ADMIN — PHENYLEPHRINE HYDROCHLORIDE 100 MCG: 10 INJECTION INTRAVENOUS at 13:50

## 2018-06-22 RX ADMIN — BUPIVACAINE HYDROCHLORIDE IN DEXTROSE 1.6 ML: 7.5 INJECTION, SOLUTION SUBARACHNOID at 13:44

## 2018-06-22 NOTE — PLAN OF CARE
Problem: Patient Care Overview  Goal: Plan of Care Review  Outcome: Ongoing (interventions implemented as appropriate)    Goal: Individualization and Mutuality  Outcome: Ongoing (interventions implemented as appropriate)    Goal: Discharge Needs Assessment  Outcome: Ongoing (interventions implemented as appropriate)    Goal: Interprofessional Rounds/Family Conf  Outcome: Ongoing (interventions implemented as appropriate)      Problem:  Delivery (Adult,Obstetrics,Pediatric)  Goal: Signs and Symptoms of Listed Potential Problems Will be Absent, Minimized or Managed ( Delivery)  Outcome: Ongoing (interventions implemented as appropriate)

## 2018-06-22 NOTE — OP NOTE
Obed Cedillo  : 1985  MRN: [id number]  CSN: [id number]    Operative Report    Pre-Operative Dx:   1. IUP at 39w2d weeks   2. Previous  section - declines    3. Desires permanent surgical sterilization      Postoperative dx:    1. Same as above     Procedure: Repeat  (LTCS) with bilateral placement of filshie clips       Surgeon: Jaimie Arita M.D.   Assistant: DIOMEDES Tang       Anesthesia:   Spinal       EBL: 1,000 mls.     Antibiotics: Gent and Clindamycin     Drains: Kay     Findings: Viable female infant with nuchal cord x 1     Indications: See H&P    Procedure Details:   After the appropriate time out, the patient was prepped and draped in the usual sterile fashion.  She had been placed in the dorsal supine left lateral tilt position.  A kay catheter had been placed in the bladder for drainage during the procedure. A pfannenstiel skin incision was made with a knife and carried down to the fascia.  The fascia was nicked with a scalpel and the incision was extended bilaterally with curved Randolph scissors. The superior aspect of the fascia was grasped with Kocher clamps x 2 and bluntly as well as sharply dissected away from the underlying rectus muscles.  A similar process was repeated inferiorly. The rectus muscles were  and the peritoneal cavity was entered sharply without complications. The bladder flap was created with Metzenbaum scissors and pickups with teeth.  The  retractor was placed and after checking for no entrapment, was retracted down.  A transverse uterine incision was made with the knife and extended bilaterally.  The infant was delivered from the vertex presentation.  The mouth and nose were bulb suctioned.  The cord was doubly clamped and cut and the infant handed to the pediatric nurse in attendance.  Cord blood was obtained.  The placenta was manually extracted from the uterus.  The uterus was then elevated from the  abdomen and wiped free of remaining membranes.  The uterine incision was closed with #1 chromic in a running locking fashion with a second imbricating stitch.  The left fallopian tube was identified by its fimbriated end and a Filshie clip was placed perpendicular to the isthmic portion of the tube.  Likewise, the same procedure was performed on the contralateral side. The uterus had been returned to the abdomen.  The lateral gutters were wiped free of remaining clots.  The site of surgical incision was inspected and noted to be hemostatic.  Repeat inspection revealed bilateral clip placement to be intact. The  retractor was removed.  The subfascial tissue was inspected and noted to be hemostatic.  The fascia was closed with 0 PDS in a running non-locking fashion.  Subcutaneous tissue was inspected and noted to be hemostatic with minimal bovie electrocautery.  Shannon's fascia was closed with 3-0 chromic in a running non-locking fashion.  The skin was approximated with staples.  Dressings were placed.  All instrument and sponge counts were correct at the end of the procedure. The patient tolerated the procedure well.  There were no complications.  She was taken to postoperative recovery room in stable condition.     Complications:   None      Disposition:   Mother to Mother Baby/Postpartum  in stable condition currently.   Baby to NBN  in stable condition currently.     Jaimie Arita M.D.  2018  2:26 PM

## 2018-06-22 NOTE — ANESTHESIA POSTPROCEDURE EVALUATION
Patient: Elizabeth Cedillo    Procedure Summary     Date:  18 Room / Location:  [facility] LABOR DELIVERY 2 / [facility] LABOR DELIVERY    Anesthesia Start:  1333 Anesthesia Stop:      Procedure:   SECTION REPEAT WITH TUBAL (N/A Abdomen) Diagnosis:       39 weeks gestation of pregnancy      (39 weeks gestation of pregnancy [Z3A.39])    Surgeon:  Jaimie Arita MD Provider:  Barby Ojeda CRNA    Anesthesia Type:  spinal ASA Status:  2          Anesthesia Type: spinal  Last vitals  BP   110/58 (18 1440)   Temp   97.8 °F (36.6 °C) (18 1430)   Pulse   58 (18 1440)   Resp   14 (18 1440)     SpO2   99 % (18 1440)     Post Anesthesia Care and Evaluation    Patient location during evaluation: PACU  Patient participation: complete - patient participated  Level of consciousness: awake and alert  Pain score: 0  Pain management: satisfactory to patient  Airway patency: patent  Anesthetic complications: No anesthetic complications  PONV Status: none  Cardiovascular status: acceptable and stable  Respiratory status: acceptable  Hydration status: acceptable

## 2018-06-22 NOTE — H&P (VIEW-ONLY)
[facility]  Elizabeth Cedillo  : 1985  MRN: [id number]  CSN: [id number]    History and Physical    Subjective   Elizabeth Cedillo is a 33 y.o. year old  with an Estimated Date of Delivery: 18 scheduled for  delivery due to previous C/S - declines .  Her placental location is posterior.  She is planning for sterilization at the time of the .    Prenatal care has been with [facility].  It has been benign.    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      Current Outpatient Prescriptions on File Prior to Encounter   Medication Sig Dispense Refill   • Prenatal Vit-Fe Fumarate-FA (PRENATAL, CLASSIC, VITAMIN) 28-0.8 MG tablet tablet Take  by mouth Daily.       Allergies   Allergen Reactions   • Ceclor [Cefaclor] Shortness Of Breath and Rash   • Sulfa Antibiotics Shortness Of Breath and Rash     Past Surgical History:   Procedure Laterality Date   •  SECTION  03/15/2013     Family History   Problem Relation Age of Onset   • No Known Problems Father    • No Known Problems Mother    • No Known Problems Brother    • No  Known Problems Sister    • No Known Problems Son    • No Known Problems Daughter    • No Known Problems Paternal Grandfather    • No Known Problems Paternal Grandmother    • No Known Problems Maternal Grandmother    • No Known Problems Maternal Grandfather      Social History     Social History   • Marital status: Single     Social History Main Topics   • Smoking status: Current Every Day Smoker     Packs/day: 0.25     Types: Cigarettes   • Smokeless tobacco: Never Used   • Alcohol use No   • Drug use: No   • Sexual activity: Yes     Partners: Male     Birth control/ protection: None     Other Topics Concern   • Not on file     Review of Systems  The following systems were reviewed and negative:  constitution, eyes, ENT, respiratory, cardiovascular, gastrointestinal, genitourinary, integument, breast, hematologic / lymphatic, musculoskeletal, neurological, behavioral/psych, endocrine and allergies / immunologic     Objective  Recent Vitals       6/7/2018 6/15/2018 6/21/2018       BP: 126/66 126/64 120/60     Weight: 74.4 kg (164 lb) 74.4 kg (164 lb) 73.9 kg (163 lb)         Physical Exam:  General Appearance: alert, appears stated age and cooperative  Head: normocephalic, without obvious abnormality and atraumatic  Eyes: lids and lashes normal, conjunctivae and sclerae normal, no icterus, no pallor and corneas clear  Lungs: clear to auscultation, respirations regular, respirations even and respirations unlabored  Heart: regular rhythm and normal rate and normal S1, S2  Abdomen: no hepatomegaly, no splenomegaly, soft non-tender, no guarding, no rebound tenderness and uterus gravid and nontender  Extremities: moves extremities well, no edema, no cyanosis and no redness  Skin: no bleeding, bruising or rash and no lesions noted  Psych: normal mood and affect, oriented to person, time and place, thought content organized and appropriate judgment    Prenatal Labs  Lab Results   Component Value Date    HGB 12.9 06/21/2018     HEPBSAG Negative 2017    ABSCRN Positive 2018    UEL2VVD2 Non Reactive 2017       Recent Labs  Lab Results   Component Value Date    HGB 12.9 2018    HCT 36.5 (L) 2018    WBC 9.15 2018     2018           Assessment   1. IUP with an Estimated Date of Delivery: 18.  2. Planned  section for previous C/S - declines .  3. Desires permanent surgical sterilization.     Plan   1. Repeat  with sterilization.  2. ABx and DVT prophylaxis.  3. Risks, complications, benefits, and other alternatives discussed.  4. All questions answered and pt in agreement with plan.    Jaimie Arita M.D.  2018

## 2018-06-22 NOTE — ANESTHESIA PREPROCEDURE EVALUATION
Anesthesia Evaluation     Patient summary reviewed and Nursing notes reviewed   no history of anesthetic complications:  NPO Solid Status: > 8 hours  NPO Liquid Status: > 8 hours           Airway   Mallampati: II  TM distance: >3 FB  Neck ROM: full  No difficulty expected  Dental - normal exam     Pulmonary - normal exam   (+) a smoker Current Abstained day of surgery, asthma,   Cardiovascular - negative cardio ROS and normal exam        Neuro/Psych  GI/Hepatic/Renal/Endo - negative ROS     Musculoskeletal (-) negative ROS    Abdominal    Substance History - negative use     OB/GYN    (+) Pregnant,         Other - negative ROS       ROS/Med Hx Other: Plt: 222                  Anesthesia Plan    ASA 2     spinal   (Risk and benefits discussed, discussed risk of nausea, vomiting, itching, low blood pressure, post-procedural back pain, PDPH, and infection. Patient reports understanding. Continue with POC)  intravenous induction   Anesthetic plan and risks discussed with patient.

## 2018-06-22 NOTE — ANESTHESIA PROCEDURE NOTES
Intrathecal Block    Patient location during procedure: OR  Indication:at surgeon's request and post-op pain management  Performed By  CRNA: UMA NORIEGA  Preanesthetic Checklist  Completed: patient identified, site marked, surgical consent, pre-op evaluation, timeout performed, IV checked, risks and benefits discussed and monitors and equipment checked  Additional Notes  Clear CSF noted. Good CSF swirl noted prior to injection of medication.  Intrathecal Block Prep:  Pt Position:sitting  Sterile Tech:cap, gloves, mask and sterile barrier  Prep:chloraprep  Monitoring:blood pressure monitoring, continuous pulse oximetry and EKG  Intrathecal Block Procedure:  Approach:midline  Guidance:landmark technique and palpation technique  Location:L4-L5  Needle Type:Emery  Needle Gauge:25 G  Placement of Needle Event: cerebrospinal fluid  Fluid Appearance:clear  Post Assessment:  Patient Tolerance:patient tolerated the procedure well with no apparent complications  Complications:no

## 2018-06-23 PROBLEM — Z67.91 RH NEGATIVE STATUS DURING PREGNANCY: Status: RESOLVED | Noted: 2017-12-13 | Resolved: 2018-06-23

## 2018-06-23 PROBLEM — Z34.80 ENCOUNTER FOR SUPERVISION OF OTHER NORMAL PREGNANCY: Status: RESOLVED | Noted: 2018-01-03 | Resolved: 2018-06-23

## 2018-06-23 PROBLEM — Z98.891 S/P CESAREAN SECTION: Status: ACTIVE | Noted: 2018-06-23

## 2018-06-23 PROBLEM — Z3A.39 39 WEEKS GESTATION OF PREGNANCY: Status: RESOLVED | Noted: 2018-06-21 | Resolved: 2018-06-23

## 2018-06-23 PROBLEM — O36.0191: Status: RESOLVED | Noted: 2017-12-13 | Resolved: 2018-06-23

## 2018-06-23 PROBLEM — O26.899 RH NEGATIVE STATUS DURING PREGNANCY: Status: RESOLVED | Noted: 2017-12-13 | Resolved: 2018-06-23

## 2018-06-23 PROBLEM — Z34.90 PREGNANCY: Status: RESOLVED | Noted: 2018-06-22 | Resolved: 2018-06-23

## 2018-06-23 LAB
ABO GROUP BLD: NORMAL
BASOPHILS # BLD AUTO: 0.04 10*3/MM3 (ref 0–0.2)
BASOPHILS NFR BLD AUTO: 0.4 % (ref 0–2.5)
DEPRECATED RDW RBC AUTO: 43.1 FL (ref 37–54)
EOSINOPHIL # BLD AUTO: 0.08 10*3/MM3 (ref 0–0.7)
EOSINOPHIL NFR BLD AUTO: 0.8 % (ref 0–7)
ERYTHROCYTE [DISTWIDTH] IN BLOOD BY AUTOMATED COUNT: 12.5 % (ref 11.5–14.5)
FETAL BLEED: NEGATIVE
HCT VFR BLD AUTO: 28.3 % (ref 37–47)
HGB BLD-MCNC: 10.2 G/DL (ref 12–16)
IMM GRANULOCYTES # BLD: 0.02 10*3/MM3 (ref 0–0.06)
IMM GRANULOCYTES NFR BLD: 0.2 % (ref 0–0.6)
LYMPHOCYTES # BLD AUTO: 2.79 10*3/MM3 (ref 0.6–3.4)
LYMPHOCYTES NFR BLD AUTO: 28.2 % (ref 10–50)
MCH RBC QN AUTO: 33.9 PG (ref 27–31)
MCHC RBC AUTO-ENTMCNC: 36 G/DL (ref 30–37)
MCV RBC AUTO: 94 FL (ref 81–99)
MONOCYTES # BLD AUTO: 0.59 10*3/MM3 (ref 0–0.9)
MONOCYTES NFR BLD AUTO: 6 % (ref 0–12)
NEUTROPHILS # BLD AUTO: 6.36 10*3/MM3 (ref 2–6.9)
NEUTROPHILS NFR BLD AUTO: 64.4 % (ref 37–80)
NRBC BLD MANUAL-RTO: 0 /100 WBC (ref 0–0)
NUMBER OF DOSES: NORMAL
PLATELET # BLD AUTO: 197 10*3/MM3 (ref 130–400)
PMV BLD AUTO: 10.5 FL (ref 6–12)
RBC # BLD AUTO: 3.01 10*6/MM3 (ref 4.2–5.4)
RH BLD: NEGATIVE
WBC NRBC COR # BLD: 9.88 10*3/MM3 (ref 4.8–10.8)

## 2018-06-23 PROCEDURE — 25010000002 RHO D IMMUNE GLOBULIN 1500 UNIT/2ML SOLUTION PREFILLED SYRINGE: Performed by: OBSTETRICS & GYNECOLOGY

## 2018-06-23 PROCEDURE — 99024 POSTOP FOLLOW-UP VISIT: CPT | Performed by: MIDWIFE

## 2018-06-23 PROCEDURE — 25810000003 DEXTROSE-NACL PER 500 ML: Performed by: OBSTETRICS & GYNECOLOGY

## 2018-06-23 PROCEDURE — 85025 COMPLETE CBC W/AUTO DIFF WBC: CPT | Performed by: OBSTETRICS & GYNECOLOGY

## 2018-06-23 PROCEDURE — 86900 BLOOD TYPING SEROLOGIC ABO: CPT | Performed by: OBSTETRICS & GYNECOLOGY

## 2018-06-23 PROCEDURE — 85461 HEMOGLOBIN FETAL: CPT | Performed by: OBSTETRICS & GYNECOLOGY

## 2018-06-23 PROCEDURE — 25010000002 ONDANSETRON PER 1 MG: Performed by: NURSE ANESTHETIST, CERTIFIED REGISTERED

## 2018-06-23 PROCEDURE — 86901 BLOOD TYPING SEROLOGIC RH(D): CPT | Performed by: OBSTETRICS & GYNECOLOGY

## 2018-06-23 RX ORDER — FERROUS SULFATE TAB EC 324 MG (65 MG FE EQUIVALENT) 324 (65 FE) MG
324 TABLET DELAYED RESPONSE ORAL 2 TIMES DAILY WITH MEALS
Status: DISCONTINUED | OUTPATIENT
Start: 2018-06-23 | End: 2018-06-24 | Stop reason: HOSPADM

## 2018-06-23 RX ADMIN — DOCUSATE SODIUM 100 MG: 100 CAPSULE, LIQUID FILLED ORAL at 08:35

## 2018-06-23 RX ADMIN — IBUPROFEN 600 MG: 600 TABLET ORAL at 02:34

## 2018-06-23 RX ADMIN — SIMETHICONE CHEW TAB 80 MG 80 MG: 80 TABLET ORAL at 17:37

## 2018-06-23 RX ADMIN — OXYCODONE HYDROCHLORIDE AND ACETAMINOPHEN 2 TABLET: 5; 325 TABLET ORAL at 08:34

## 2018-06-23 RX ADMIN — OXYCODONE HYDROCHLORIDE AND ACETAMINOPHEN 2 TABLET: 5; 325 TABLET ORAL at 22:34

## 2018-06-23 RX ADMIN — OXYCODONE HYDROCHLORIDE AND ACETAMINOPHEN 2 TABLET: 5; 325 TABLET ORAL at 12:16

## 2018-06-23 RX ADMIN — HUMAN RHO(D) IMMUNE GLOBULIN 1500 UNITS: 1500 SOLUTION INTRAMUSCULAR; INTRAVENOUS at 18:02

## 2018-06-23 RX ADMIN — SIMETHICONE CHEW TAB 80 MG 80 MG: 80 TABLET ORAL at 08:34

## 2018-06-23 RX ADMIN — DEXTROSE AND SODIUM CHLORIDE 125 ML/HR: 5; 900 INJECTION, SOLUTION INTRAVENOUS at 02:23

## 2018-06-23 RX ADMIN — IBUPROFEN 600 MG: 600 TABLET ORAL at 17:37

## 2018-06-23 RX ADMIN — FERROUS SULFATE TAB EC 324 MG (65 MG FE EQUIVALENT) 324 MG: 324 (65 FE) TABLET DELAYED RESPONSE at 17:37

## 2018-06-23 RX ADMIN — IBUPROFEN 600 MG: 600 TABLET ORAL at 08:34

## 2018-06-23 RX ADMIN — OXYCODONE HYDROCHLORIDE AND ACETAMINOPHEN 2 TABLET: 5; 325 TABLET ORAL at 17:37

## 2018-06-23 RX ADMIN — ONDANSETRON 4 MG: 4 TABLET, FILM COATED ORAL at 02:35

## 2018-06-23 RX ADMIN — ONDANSETRON 4 MG: 2 INJECTION, SOLUTION INTRAMUSCULAR; INTRAVENOUS at 08:33

## 2018-06-23 RX ADMIN — SIMETHICONE CHEW TAB 80 MG 80 MG: 80 TABLET ORAL at 22:47

## 2018-06-23 RX ADMIN — SIMETHICONE CHEW TAB 80 MG 80 MG: 80 TABLET ORAL at 12:16

## 2018-06-23 RX ADMIN — DOCUSATE SODIUM 100 MG: 100 CAPSULE, LIQUID FILLED ORAL at 22:33

## 2018-06-23 NOTE — PROGRESS NOTES
[facility] Obed Cedillo  : 1985  MRN: [id number]  CSN: [id number]    Post-operative Day #1  Subjective   Her pain is well controlled.  Vaginal bleeding is appropriate amount.  She is not passing gas and has not had a bowel movement.  Upon review of her respiratory system, she has no respiratory symptoms and and denies SOB, cough, wheezing, chest pain.     Objective     Min/max vitals past 24 hours:   Temp  Min: 97.3 °F (36.3 °C)  Max: 98.4 °F (36.9 °C)  BP  Min: 98/62  Max: 133/83  Pulse  Min: 49  Max: 65  Resp  Min: 12  Max: 20        General: well developed; well nourished  no acute distress   Resp: breathing is unlabored   CV: Not performed.   Abdomen: soft, non-tender; no masses  incision is covered by bandage   Pelvic: Not performed   Ext: normal appearance with no cyanosis or edema and no calf tenderness     Last 3 values     Results from last 7 days  Lab Units 18  0525 18  1058   WBC 10*3/mm3 9.88 9.15   HEMOGLOBIN g/dL 10.2* 12.9   HEMATOCRIT % 28.3* 36.5*   PLATELETS 10*3/mm3 197 222     Lab Results   Component Value Date    ABO O 2018    RH Negative 2018    RUBELLAABIGG 2.83 2017    HEPBSAG Negative 2017          Assessment   1. POD #1 S/P Repeat  (LTCS)     Plan   1. Continue routine post-operative care  2. Ferrous Sulfate BID    ALFONSO Angulo  2018  10:49 AM

## 2018-06-23 NOTE — PLAN OF CARE
Problem: Patient Care Overview  Goal: Plan of Care Review  Outcome: Ongoing (interventions implemented as appropriate)   18 1644 18 1930   Coping/Psychosocial   Plan of Care Reviewed With --  patient   Plan of Care Review   Progress improving --    OTHER   Outcome Summary Normal immed c/s PP course  --      Goal: Individualization and Mutuality  Outcome: Ongoing (interventions implemented as appropriate)   18 1644   Individualization   Patient Specific Preferences Bottlefeeding infant   Patient Specific Goals (Include Timeframe) rooming in    Patient Specific Interventions skin to skin   Mutuality/Individual Preferences   What Anxieties, Fears, Concerns, or Questions Do You Have About Your Care? none   What Information Would Help Us Give You More Personalized Care? 4th child   How Would You and/or Your Support Person Like to Participate in Your Care? support    Mutuality/Individual Preferences   How to Address Anxieties/Fears none     Goal: Discharge Needs Assessment  Outcome: Ongoing (interventions implemented as appropriate)   18 1056 18 1644   Discharge Needs Assessment   Readmission Within the Last 30 Days --  no previous admission in last 30 days   Concerns to be Addressed --  no discharge needs identified   Patient/Family Anticipates Transition to --  home   Patient/Family Anticipated Services at Transition --  none   Transportation Concerns --  car, none   Transportation Anticipated family or friend will provide --    Anticipated Changes Related to Illness --  none   Equipment Needed After Discharge --  none   Disability   Equipment Currently Used at Home --  none     Goal: Interprofessional Rounds/Family Conf  Outcome: Ongoing (interventions implemented as appropriate)   18 1644   Interdisciplinary Rounds/Family Conf   Summary POC reviewed   Participants nursing;patient       Problem:  Delivery (Adult,Obstetrics,Pediatric)  Goal: Signs and Symptoms of Listed  Potential Problems Will be Absent, Minimized or Managed ( Delivery)  Outcome: Ongoing (interventions implemented as appropriate)   18 3360   Goal/Outcome Evaluation   Problems Assessed ( Delivery) all   Problems Present ( Delivery) none

## 2018-06-23 NOTE — CONSULTS
Patient: Elizabeth Cedillo  Procedure(s):   SECTION REPEAT WITH TUBAL  Anesthesia type: [unfilled]    Patient location: Labor and Delivery  Last vitals:   Vitals:    18 0500   BP: 98/62   Pulse: 51   Resp: 18   Temp: 98.3 °F (36.8 °C)   SpO2: 98%     Level of consciousness: awake, alert and oriented    Post-anesthesia pain: adequate analgesia  Airway patency: patent  Respiratory: unassisted  Cardiovascular: stable and blood pressure at baseline  Hydration: euvolemic    Anesthetic complications: no

## 2018-06-24 VITALS
HEIGHT: 62 IN | HEART RATE: 54 BPM | RESPIRATION RATE: 16 BRPM | SYSTOLIC BLOOD PRESSURE: 127 MMHG | OXYGEN SATURATION: 97 % | DIASTOLIC BLOOD PRESSURE: 60 MMHG | BODY MASS INDEX: 30.18 KG/M2 | TEMPERATURE: 97.7 F | WEIGHT: 164 LBS

## 2018-06-24 PROCEDURE — 99024 POSTOP FOLLOW-UP VISIT: CPT | Performed by: MIDWIFE

## 2018-06-24 RX ORDER — IBUPROFEN 600 MG/1
600 TABLET ORAL EVERY 6 HOURS PRN
Qty: 60 TABLET | Refills: 0 | Status: SHIPPED | OUTPATIENT
Start: 2018-06-24 | End: 2018-07-31

## 2018-06-24 RX ORDER — FERROUS SULFATE TAB EC 324 MG (65 MG FE EQUIVALENT) 324 (65 FE) MG
324 TABLET DELAYED RESPONSE ORAL DAILY
Qty: 30 TABLET | Refills: 0 | Status: SHIPPED | OUTPATIENT
Start: 2018-06-24 | End: 2018-07-31

## 2018-06-24 RX ORDER — OXYCODONE HYDROCHLORIDE AND ACETAMINOPHEN 5; 325 MG/1; MG/1
2 TABLET ORAL EVERY 4 HOURS PRN
Qty: 30 TABLET | Refills: 0 | Status: SHIPPED | OUTPATIENT
Start: 2018-06-24 | End: 2018-07-02

## 2018-06-24 RX ORDER — PSEUDOEPHEDRINE HCL 30 MG
100 TABLET ORAL 2 TIMES DAILY PRN
Qty: 30 CAPSULE | Refills: 0 | Status: SHIPPED | OUTPATIENT
Start: 2018-06-24 | End: 2018-07-31

## 2018-06-24 RX ADMIN — SIMETHICONE CHEW TAB 80 MG 80 MG: 80 TABLET ORAL at 10:22

## 2018-06-24 RX ADMIN — FERROUS SULFATE TAB EC 324 MG (65 MG FE EQUIVALENT) 324 MG: 324 (65 FE) TABLET DELAYED RESPONSE at 10:26

## 2018-06-24 RX ADMIN — OXYCODONE HYDROCHLORIDE AND ACETAMINOPHEN 2 TABLET: 5; 325 TABLET ORAL at 05:31

## 2018-06-24 RX ADMIN — OXYCODONE HYDROCHLORIDE AND ACETAMINOPHEN 2 TABLET: 5; 325 TABLET ORAL at 10:22

## 2018-06-24 RX ADMIN — IBUPROFEN 600 MG: 600 TABLET ORAL at 05:30

## 2018-06-24 NOTE — PLAN OF CARE
Problem: Patient Care Overview  Goal: Plan of Care Review  Outcome: Ongoing (interventions implemented as appropriate)   18 0546   Coping/Psychosocial   Plan of Care Reviewed With patient   Plan of Care Review   Progress improving   OTHER   Outcome Summary Pt doing well overnight. Pain controlled with Motrin and Percocet as needed. Patient ambulating around room without diffiuclty. Able to care for self and infant. Desires discharge to home today.      Goal: Individualization and Mutuality  Outcome: Ongoing (interventions implemented as appropriate)   18 4784   Individualization   Patient Specific Preferences Bottlefeeding infant   Patient Specific Goals (Include Timeframe) rooming in    Patient Specific Interventions skin to skin   Mutuality/Individual Preferences   What Anxieties, Fears, Concerns, or Questions Do You Have About Your Care? none   What Information Would Help Us Give You More Personalized Care? 4th child   Mutuality/Individual Preferences   How to Address Anxieties/Fears none     Goal: Discharge Needs Assessment  Outcome: Ongoing (interventions implemented as appropriate)   18 0546   Discharge Needs Assessment   Readmission Within the Last 30 Days no previous admission in last 30 days   Concerns to be Addressed no discharge needs identified   Patient/Family Anticipates Transition to home   Patient/Family Anticipated Services at Transition none   Transportation Concerns car, none   Transportation Anticipated family or friend will provide   Anticipated Changes Related to Illness none   Equipment Needed After Discharge none   Disability   Equipment Currently Used at Home none       Problem:  Delivery (Adult,Obstetrics,Pediatric)  Goal: Signs and Symptoms of Listed Potential Problems Will be Absent, Minimized or Managed ( Delivery)  Outcome: Ongoing (interventions implemented as appropriate)   18 0546   Goal/Outcome Evaluation   Problems Assessed (  Delivery) all   Problems Present ( Delivery) none

## 2018-06-24 NOTE — DISCHARGE SUMMARY
Discharge Summary     Obed Cedillo  : 1985  MRN: [id number]  [facility]: [id number]    Date of Admission: 2018   Date of Discharge:  2018   Delivering Physician: Jaimie Arita MD       Admission Diagnosis: 1. Pregnancy [Z34.90]   Discharge Diagnosis: 1. Same as above plus  2. Pregnancy at 39w2d - delivered       Procedures: 1. Repeat  (LTCS)     Hospital Course  See the completed operative report for details regarding antepartum course and delivery.    Her post-operative course was unremarkable.  On POD # 2 she felt like she ready ready for D/C.  She was evaluated and it was determined she was able to be discharged to home.  She had no febrile morbidity. She had normal bowel and bladder function and was hemodynamically stable.  Her wound was healing well without obvious signs of infections.    Infant  female  fetus weighing 3856 g (8 lb 8 oz)   Apgars -  8  @ 1 minute /  9  @ 5 minutes.    Discharge labs  Lab Results   Component Value Date    WBC 9.88 2018    HGB 10.2 (L) 2018    HCT 28.3 (L) 2018     2018       Discharge Medications     Discharge Medications      New Medications      Instructions Start Date   docusate sodium 100 MG capsule   100 mg, Oral, 2 Times Daily PRN      ferrous sulfate 324 (65 Fe) MG tablet delayed-release EC tablet   324 mg, Oral, Daily      ibuprofen 600 MG tablet  Commonly known as:  ADVIL,MOTRIN   600 mg, Oral, Every 6 Hours PRN      oxyCODONE-acetaminophen 5-325 MG per tablet  Commonly known as:  PERCOCET   2 tablets, Oral, Every 4 Hours PRN         Continue These Medications      Instructions Start Date   prenatal (CLASSIC) vitamin 28-0.8 MG tablet tablet   Oral, Daily             Discharge Disposition Home or Self Care   Condition on Discharge: good   Follow-up: 1 week with [facility].     Julianne Barone, APRN  2018

## 2018-06-25 LAB
ABO + RH BLD: NORMAL
ABO + RH BLD: NORMAL
BH BB BLOOD EXPIRATION DATE: NORMAL
BH BB BLOOD EXPIRATION DATE: NORMAL
BH BB BLOOD TYPE BARCODE: 9500
BH BB BLOOD TYPE BARCODE: 9500
BH BB DISPENSE STATUS: NORMAL
BH BB DISPENSE STATUS: NORMAL
BH BB PRODUCT CODE: NORMAL
BH BB PRODUCT CODE: NORMAL
BH BB UNIT NUMBER: NORMAL
BH BB UNIT NUMBER: NORMAL
CROSSMATCH INTERPRETATION: NORMAL
CROSSMATCH INTERPRETATION: NORMAL
UNIT  ABO: NORMAL
UNIT  ABO: NORMAL
UNIT  RH: NORMAL
UNIT  RH: NORMAL

## 2018-06-28 ENCOUNTER — OFFICE VISIT (OUTPATIENT)
Dept: OBSTETRICS AND GYNECOLOGY | Facility: CLINIC | Age: 33
End: 2018-06-28

## 2018-06-28 VITALS
WEIGHT: 153 LBS | BODY MASS INDEX: 28.16 KG/M2 | HEIGHT: 62 IN | DIASTOLIC BLOOD PRESSURE: 70 MMHG | SYSTOLIC BLOOD PRESSURE: 116 MMHG

## 2018-06-28 DIAGNOSIS — Z98.891 S/P C-SECTION: Primary | ICD-10-CM

## 2018-06-28 PROCEDURE — 99024 POSTOP FOLLOW-UP VISIT: CPT | Performed by: NURSE PRACTITIONER

## 2018-06-30 NOTE — PROGRESS NOTES
"    Postpartum Progress Note    Patient name: Elizabeth Cedillo  Date of Service: 2018    ID: 33 y.o.     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  Ambulating, No BM since leaving hospital - voiding without problems, tolerating regular diet  Pain well controlled.         Objective:      Vital signs:  /70   Ht 157.5 cm (62\")   Wt 69.4 kg (153 lb)   LMP 2017 (Approximate)   BMI 27.98 kg/m²    General: Alert & oriented x4, in no apparent distress    Denies PP blues or depression   Incision: healed well  Extremities: nontender; no edema      Labs:  Lab Results   Component Value Date    WBC 9.88 2018    HGB 10.2 (L) 2018    HCT 28.3 (L) 2018    MCV 94.0 2018     2018         External Prenatal Results     Pregnancy Outside Results - Transcribed From Office Records - See Scanned Records For Details     Test Value Date Time    Hgb 10.2 g/dL (L) 18 0525    Hct 28.3 % (L) 18 0525    ABO O  18 0525    Rh Negative  18 0525    Antibody Screen Positive  18 1058    Glucose Fasting GTT       Glucose Tolerance Test 1 hour       Glucose Tolerance Test 3 hour       Gonorrhea (discrete) Negative  17 0826    Chlamydia (discrete) Negative  17 0826    RPR Non Reactive  17 1627    VDRL       Syphilis Antibody       Rubella 2.83 index 17 1627    HBsAg Negative  17 1627    Herpes Simplex Virus PCR       Herpes Simplex VIrus Culture       HIV Non Reactive  17 1627    Hep C RNA Quant PCR       Hep C Antibody       AFP       Group B Strep Negative  18 1121    GBS Susceptibility to Clindamycin       GBS Susceptibility to " Erythromycin       Fetal Fibronectin       Genetic Testing, Maternal Blood             Drug Screening     Test Value Date Time    Urine Drug Screen       Amphetamine Screen       Barbiturate Screen       Benzodiazepine Screen       Methadone Screen       Phencyclidine Screen       Opiates Screen       THC Screen       Cocaine Screen       Propoxyphene Screen       Buprenorphine Screen       Methamphetamine Screen       Oxycodone Screen       Tricyclic Antidepressants Screen                   Assessment/Plan:        1. S/p  delivery: Doing well.  2. Encouraged suppository or fleets enema - needs to have a BM   3. Encouraged questions and call prn   4. RTO 5 wks      Rosy Liang CNM  2018

## 2018-07-31 ENCOUNTER — POSTPARTUM VISIT (OUTPATIENT)
Dept: OBSTETRICS AND GYNECOLOGY | Facility: CLINIC | Age: 33
End: 2018-07-31

## 2018-07-31 VITALS
SYSTOLIC BLOOD PRESSURE: 126 MMHG | WEIGHT: 143 LBS | HEIGHT: 62 IN | DIASTOLIC BLOOD PRESSURE: 64 MMHG | BODY MASS INDEX: 26.31 KG/M2

## 2018-07-31 PROBLEM — O36.1120 ISOIMMUNIZATION FROM BLOOD GROUP INCOMPATIBILITY DURING PREGNANCY IN SECOND TRIMESTER: Status: RESOLVED | Noted: 2018-01-12 | Resolved: 2018-07-31

## 2018-07-31 PROBLEM — O35.00X0 CENTRAL NERVOUS SYSTEM MALFORMATION IN FETUS AFFECTING OBSTETRICAL CARE: Status: RESOLVED | Noted: 2018-01-12 | Resolved: 2018-07-31

## 2018-07-31 PROBLEM — Z98.891 S/P CESAREAN SECTION: Status: RESOLVED | Noted: 2018-06-23 | Resolved: 2018-07-31

## 2018-07-31 PROCEDURE — 99024 POSTOP FOLLOW-UP VISIT: CPT | Performed by: MIDWIFE

## 2018-07-31 NOTE — PROGRESS NOTES
"Subjective   Chief Complaint   Patient presents with   • Postpartum Care     delivered on 18  by Dr Arita, no complaints, pap due, had tubal, bottle feeding      Elizabeth Cedillo is a 33 y.o. year old  presenting to be seen for her postpartum visit.  She had a Repeat vaginal delivery with BTL.    Since delivery she has been sexually active.  She does not have concerns about post-partum blues/depression.   She is bottle feeding.  For ongoing contraception, her plans are tubal ligation.    The following portions of the patient's history were reviewed and updated as appropriate:current medications and allergies    Smoking status: Current Every Day Smoker                                                   Packs/day: 0.25      Years: 0.00         Types: Cigarettes  Smokeless tobacco: Never Used                          @ROS@  Review of Systems  Consitutional NEG: anorexia or night sweats    POS: nothing reported   Gastointestinal NEG: bloating, change in bowel habits, melena or reflux symptoms    POS: nothing reported   Genitourinary NEG: dysuria or hematuria    POS: nothing reported   Integument NEG: moles that are changing in size, shape, color or rashes    POS: nothing reported   Breast NEG: persistent breast lump, skin dimpling or nipple discharge    POS: nothing reported        Objective   /64   Ht 157.5 cm (62\")   Wt 64.9 kg (143 lb)   Breastfeeding? No   BMI 26.16 kg/m²     General:  well developed; well nourished  no acute distress   Abdomen: soft, non-tender; no masses  no umbilical or inginual hernias are present  incision is healed   Pelvis: External genitalia:  normal appearance of the external genitalia including Bartholin's and [place]'s glands.  Vaginal:  normal pink mucosa without prolapse or lesions.  Cervix:  normal appearance.  Uterus:  normal size, shape and consistency.  Adnexa:  normal bimanual exam of the adnexa.          Assessment   1. Normal 6 week postpartum exam   "   Plan   1. BC options reviewed and compared today: tubal ligation  2. Meds were prescribed - no  3. Pap smear done  4. Follow up 1 year    No orders of the defined types were placed in this encounter.         This note was electronically signed.  ALFONSO Perera  7/31/2018

## (undated) DEVICE — GLV SURG BIOGEL M LTX PF 6 1/2

## (undated) DEVICE — RICH C-SECTION: Brand: MEDLINE INDUSTRIES, INC.

## (undated) DEVICE — CUP EXTRCT VAC

## (undated) DEVICE — APPL CHLORAPREP W/TINT 26ML ORNG

## (undated) DEVICE — PAD,ABDOMINAL,8"X10",ST,LF: Brand: MEDLINE

## (undated) DEVICE — GOWN,SIRUS,NON REINFRCD,LARGE,SET IN SL: Brand: MEDLINE

## (undated) DEVICE — SUT PDS 0 CT 36IN DYED Z358T

## (undated) DEVICE — SUT GUT CHRM 1 CTX 36IN 905H

## (undated) DEVICE — PROXIMATE SKIN STAPLERS (35 WIDE) CONTAINS 35 STAINLESS STEEL STAPLES (FIXED HEAD): Brand: PROXIMATE

## (undated) DEVICE — SUTURE GUT CHROMIC 3/0 912H

## (undated) DEVICE — SUT GUT CHRM 2/0 SH 27IN G123H

## (undated) DEVICE — DRSNG TELFA ILND ADH 4X6IN

## (undated) DEVICE — CUFF SCD HEMOFORCE SEQ CALF STD MD

## (undated) DEVICE — SPNG GZ WOVN 4X4IN 12PLY 10/BX STRL

## (undated) DEVICE — LARGE, DISPOSABLE ALEXIS O C-SECTION PROTECTOR - RETRACTOR: Brand: ALEXIS ® O C-SECTION PROTECTOR - RETRACTOR